# Patient Record
Sex: FEMALE | Race: BLACK OR AFRICAN AMERICAN | NOT HISPANIC OR LATINO | URBAN - METROPOLITAN AREA
[De-identification: names, ages, dates, MRNs, and addresses within clinical notes are randomized per-mention and may not be internally consistent; named-entity substitution may affect disease eponyms.]

---

## 2023-06-25 ENCOUNTER — EMERGENCY (EMERGENCY)
Facility: HOSPITAL | Age: 38
LOS: 1 days | Discharge: PSYCHIATRIC OR SUBSTANCE USE FACILITY | End: 2023-06-27
Attending: STUDENT IN AN ORGANIZED HEALTH CARE EDUCATION/TRAINING PROGRAM
Payer: MEDICAID

## 2023-06-25 VITALS
DIASTOLIC BLOOD PRESSURE: 112 MMHG | RESPIRATION RATE: 18 BRPM | HEART RATE: 116 BPM | WEIGHT: 210.1 LBS | SYSTOLIC BLOOD PRESSURE: 192 MMHG

## 2023-06-25 DIAGNOSIS — E87.6 HYPOKALEMIA: ICD-10-CM

## 2023-06-25 DIAGNOSIS — F12.99 CANNABIS USE, UNSPECIFIED WITH UNSPECIFIED CANNABIS-INDUCED DISORDER: ICD-10-CM

## 2023-06-25 DIAGNOSIS — F31.13 BIPOLAR DISORDER, CURRENT EPISODE MANIC WITHOUT PSYCHOTIC FEATURES, SEVERE: ICD-10-CM

## 2023-06-25 DIAGNOSIS — Z20.822 CONTACT WITH AND (SUSPECTED) EXPOSURE TO COVID-19: ICD-10-CM

## 2023-06-25 DIAGNOSIS — F20.9 SCHIZOPHRENIA, UNSPECIFIED: ICD-10-CM

## 2023-06-25 DIAGNOSIS — D64.9 ANEMIA, UNSPECIFIED: ICD-10-CM

## 2023-06-25 DIAGNOSIS — R45.1 RESTLESSNESS AND AGITATION: ICD-10-CM

## 2023-06-25 LAB
ALBUMIN SERPL ELPH-MCNC: 3.6 G/DL — SIGNIFICANT CHANGE UP (ref 3.3–5)
ALP SERPL-CCNC: 41 U/L — SIGNIFICANT CHANGE UP (ref 40–120)
ALT FLD-CCNC: 88 U/L — HIGH (ref 12–78)
ANION GAP SERPL CALC-SCNC: 9 MMOL/L — SIGNIFICANT CHANGE UP (ref 5–17)
ANISOCYTOSIS BLD QL: SLIGHT — SIGNIFICANT CHANGE UP
APAP SERPL-MCNC: < 2 UG/ML (ref 10–30)
AST SERPL-CCNC: 68 U/L — HIGH (ref 15–37)
BASOPHILS # BLD AUTO: 0.02 K/UL — SIGNIFICANT CHANGE UP (ref 0–0.2)
BASOPHILS NFR BLD AUTO: 0.3 % — SIGNIFICANT CHANGE UP (ref 0–2)
BILIRUB SERPL-MCNC: 0.6 MG/DL — SIGNIFICANT CHANGE UP (ref 0.2–1.2)
BUN SERPL-MCNC: 9 MG/DL — SIGNIFICANT CHANGE UP (ref 7–23)
CALCIUM SERPL-MCNC: 8.7 MG/DL — SIGNIFICANT CHANGE UP (ref 8.5–10.1)
CHLORIDE SERPL-SCNC: 107 MMOL/L — SIGNIFICANT CHANGE UP (ref 96–108)
CO2 SERPL-SCNC: 22 MMOL/L — SIGNIFICANT CHANGE UP (ref 22–31)
CREAT SERPL-MCNC: 0.89 MG/DL — SIGNIFICANT CHANGE UP (ref 0.5–1.3)
EGFR: 85 ML/MIN/1.73M2 — SIGNIFICANT CHANGE UP
EOSINOPHIL # BLD AUTO: 0 K/UL — SIGNIFICANT CHANGE UP (ref 0–0.5)
EOSINOPHIL NFR BLD AUTO: 0 % — SIGNIFICANT CHANGE UP (ref 0–6)
ETHANOL SERPL-MCNC: <10 MG/DL — SIGNIFICANT CHANGE UP (ref 0–10)
FLUAV AG NPH QL: SIGNIFICANT CHANGE UP
FLUBV AG NPH QL: SIGNIFICANT CHANGE UP
GLUCOSE SERPL-MCNC: 115 MG/DL — HIGH (ref 70–99)
HCG SERPL-ACNC: <1 MIU/ML — SIGNIFICANT CHANGE UP
HCT VFR BLD CALC: 25.9 % — LOW (ref 34.5–45)
HGB BLD-MCNC: 7.4 G/DL — LOW (ref 11.5–15.5)
HYPOCHROMIA BLD QL: SLIGHT — SIGNIFICANT CHANGE UP
IMM GRANULOCYTES NFR BLD AUTO: 0.3 % — SIGNIFICANT CHANGE UP (ref 0–0.9)
LYMPHOCYTES # BLD AUTO: 1.2 K/UL — SIGNIFICANT CHANGE UP (ref 1–3.3)
LYMPHOCYTES # BLD AUTO: 19.6 % — SIGNIFICANT CHANGE UP (ref 13–44)
MANUAL SMEAR VERIFICATION: SIGNIFICANT CHANGE UP
MCHC RBC-ENTMCNC: 19.5 PG — LOW (ref 27–34)
MCHC RBC-ENTMCNC: 28.6 G/DL — LOW (ref 32–36)
MCV RBC AUTO: 68.2 FL — LOW (ref 80–100)
MICROCYTES BLD QL: SLIGHT — SIGNIFICANT CHANGE UP
MONOCYTES # BLD AUTO: 0.54 K/UL — SIGNIFICANT CHANGE UP (ref 0–0.9)
MONOCYTES NFR BLD AUTO: 8.8 % — SIGNIFICANT CHANGE UP (ref 2–14)
NEUTROPHILS # BLD AUTO: 4.34 K/UL — SIGNIFICANT CHANGE UP (ref 1.8–7.4)
NEUTROPHILS NFR BLD AUTO: 71 % — SIGNIFICANT CHANGE UP (ref 43–77)
NRBC # BLD: 0 /100 WBCS — SIGNIFICANT CHANGE UP (ref 0–0)
PLAT MORPH BLD: NORMAL — SIGNIFICANT CHANGE UP
PLATELET # BLD AUTO: 201 K/UL — SIGNIFICANT CHANGE UP (ref 150–400)
POLYCHROMASIA BLD QL SMEAR: SLIGHT — SIGNIFICANT CHANGE UP
POTASSIUM SERPL-MCNC: 2.7 MMOL/L — CRITICAL LOW (ref 3.5–5.3)
POTASSIUM SERPL-SCNC: 2.7 MMOL/L — CRITICAL LOW (ref 3.5–5.3)
PROT SERPL-MCNC: 7.6 GM/DL — SIGNIFICANT CHANGE UP (ref 6–8.3)
RBC # BLD: 3.8 M/UL — SIGNIFICANT CHANGE UP (ref 3.8–5.2)
RBC # FLD: 23 % — HIGH (ref 10.3–14.5)
RBC BLD AUTO: ABNORMAL
SALICYLATES SERPL-MCNC: 3.3 MG/DL — SIGNIFICANT CHANGE UP (ref 2.8–20)
SARS-COV-2 RNA SPEC QL NAA+PROBE: SIGNIFICANT CHANGE UP
SODIUM SERPL-SCNC: 138 MMOL/L — SIGNIFICANT CHANGE UP (ref 135–145)
TSH SERPL-MCNC: 1.58 UIU/ML — SIGNIFICANT CHANGE UP (ref 0.36–3.74)
WBC # BLD: 6.12 K/UL — SIGNIFICANT CHANGE UP (ref 3.8–10.5)
WBC # FLD AUTO: 6.12 K/UL — SIGNIFICANT CHANGE UP (ref 3.8–10.5)

## 2023-06-25 PROCEDURE — 99285 EMERGENCY DEPT VISIT HI MDM: CPT

## 2023-06-25 PROCEDURE — 70450 CT HEAD/BRAIN W/O DYE: CPT | Mod: 26,MA

## 2023-06-25 RX ORDER — OLANZAPINE 15 MG/1
10 TABLET, FILM COATED ORAL ONCE
Refills: 0 | Status: DISCONTINUED | OUTPATIENT
Start: 2023-06-25 | End: 2023-06-25

## 2023-06-25 RX ORDER — POTASSIUM CHLORIDE 20 MEQ
10 PACKET (EA) ORAL
Refills: 0 | Status: COMPLETED | OUTPATIENT
Start: 2023-06-25 | End: 2023-06-25

## 2023-06-25 RX ORDER — DIPHENHYDRAMINE HCL 50 MG
50 CAPSULE ORAL ONCE
Refills: 0 | Status: COMPLETED | OUTPATIENT
Start: 2023-06-25 | End: 2023-06-25

## 2023-06-25 RX ORDER — HALOPERIDOL DECANOATE 100 MG/ML
5 INJECTION INTRAMUSCULAR ONCE
Refills: 0 | Status: COMPLETED | OUTPATIENT
Start: 2023-06-25 | End: 2023-06-25

## 2023-06-25 RX ORDER — POTASSIUM CHLORIDE 20 MEQ
40 PACKET (EA) ORAL EVERY 4 HOURS
Refills: 0 | Status: DISCONTINUED | OUTPATIENT
Start: 2023-06-25 | End: 2023-06-26

## 2023-06-25 RX ADMIN — HALOPERIDOL DECANOATE 5 MILLIGRAM(S): 100 INJECTION INTRAMUSCULAR at 18:15

## 2023-06-25 RX ADMIN — Medication 100 MILLIEQUIVALENT(S): at 21:56

## 2023-06-25 RX ADMIN — Medication 100 MILLIEQUIVALENT(S): at 20:44

## 2023-06-25 RX ADMIN — Medication 100 MILLIEQUIVALENT(S): at 23:25

## 2023-06-25 RX ADMIN — Medication 2 MILLIGRAM(S): at 18:16

## 2023-06-25 RX ADMIN — HALOPERIDOL DECANOATE 5 MILLIGRAM(S): 100 INJECTION INTRAMUSCULAR at 17:11

## 2023-06-25 RX ADMIN — Medication 50 MILLIGRAM(S): at 17:11

## 2023-06-25 RX ADMIN — Medication 2 MILLIGRAM(S): at 17:12

## 2023-06-25 RX ADMIN — Medication 40 MILLIEQUIVALENT(S): at 20:43

## 2023-06-25 NOTE — ED PROVIDER NOTE - ATTENDING CONTRIBUTION TO CARE
Pt seen primarily by NP. Case, treatment plan, disposition discussed with NP. Pt for psych admission and transfer. I have read the documenation by KESHA Alanis and agree with it as written.

## 2023-06-25 NOTE — ED ADULT NURSE NOTE - ED STAT RN HANDOFF DETAILS
Report given to Tamela REARDON .Constant observation in progress. Mother at bedside, no complaints voiced at this time. Safety measures in place. Assessment ongoing.

## 2023-06-25 NOTE — ED ADULT NURSE NOTE - NSFALLUNIVINTERV_ED_ALL_ED
Bed/Stretcher in lowest position, wheels locked, appropriate side rails in place/Call bell, personal items and telephone in reach/Instruct patient to call for assistance before getting out of bed/chair/stretcher/Non-slip footwear applied when patient is off stretcher/Mallie to call system/Physically safe environment - no spills, clutter or unnecessary equipment/Purposeful proactive rounding/Room/bathroom lighting operational, light cord in reach

## 2023-06-25 NOTE — ED ADULT NURSE REASSESSMENT NOTE - NS ED NURSE REASSESS COMMENT FT1
Patient agitated and screaming, attempting to leave room. Code gray called, meds administered as ordered, which patient willingly accepted. Brother remains in room, EO sitter at bedside.

## 2023-06-25 NOTE — ED ADULT TRIAGE NOTE - CHIEF COMPLAINT QUOTE
pt c/o insomnia, hallucinating, and aggressive behaviour at home for  3 days.  history of paranoid schizophrenia. pt c/o insomnia, hallucinating, and aggressive behaviour at home for  3 days.  history of paranoid schizophrenia non complaint with medication.

## 2023-06-25 NOTE — ED ADULT NURSE NOTE - CHIEF COMPLAINT QUOTE
pt c/o insomnia, hallucinating, and aggressive behaviour at home for  3 days.  history of paranoid schizophrenia non complaint with medication.

## 2023-06-25 NOTE — ED ADULT NURSE REASSESSMENT NOTE - NS ED NURSE REASSESS COMMENT FT1
Pt received in bed with family at bedside. Constant observation in progress. No acute distress noted. Safety measures in place. Assessment ongoing.

## 2023-06-25 NOTE — ED PROVIDER NOTE - PROGRESS NOTE DETAILS
Patient pending telepsych damaris. K low and repleted in the ED. Patient became agitated and requiring 5haldol/2ativan/50benadryl for sedation purposes. Alex Salcido DO Pt was seen and treated by Dr. Salcido pending telepsych disposition. Pt is alert and oriented x 3 smiling pleasant sts she is feeling ,much better and denies harmful thoughts to herself or others. According to the medical tech who is doing close observation pt still talking in the empty room and grasping thing in the air. Pt is speaking in clear full sentences cooperative.

## 2023-06-25 NOTE — ED PROVIDER NOTE - OBJECTIVE STATEMENT
37-year-old female remote history of schizophrenia last hospitalized approximately 15 years ago to the hospital last time she is on medications per mother presents for erratic behavior.  Mother states that over the last week patient has been having worsening erratic behavior as well as making statements that she is going to hell she is actively dying and telling everybody around her goodbye.  Patient also describes visual hallucinations of different colors such as red-blue and has been talking about herself in the third person.  Mother states that 15 years ago she was hospitalized for similar behavior.  Mother also states that patient has been drinking vodka and using marijuana however believes that the last time was approximately 1 week ago.  Patient screaming and erratic at time of interview unable to obtain further HPI from patient.

## 2023-06-25 NOTE — ED PROVIDER NOTE - CLINICAL SUMMARY MEDICAL DECISION MAKING FREE TEXT BOX
37-year-old female remote history of schizophrenia last hospitalized approximately 15 years ago to the hospital last time she is on medications per mother presents for erratic behavior.  Given most recent decompensation 15 years ago, will do CT head, will check labs, psych consult.  due to erratic behavior and danger to self and others, patient medicated with IM medication for extreme agitation.
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
## 2023-06-26 DIAGNOSIS — F31.13 BIPOLAR DISORDER, CURRENT EPISODE MANIC WITHOUT PSYCHOTIC FEATURES, SEVERE: ICD-10-CM

## 2023-06-26 LAB
AMPHET UR-MCNC: NEGATIVE — SIGNIFICANT CHANGE UP
ANION GAP SERPL CALC-SCNC: 6 MMOL/L — SIGNIFICANT CHANGE UP (ref 5–17)
APPEARANCE UR: CLEAR — SIGNIFICANT CHANGE UP
BACTERIA # UR AUTO: ABNORMAL
BARBITURATES UR SCN-MCNC: NEGATIVE — SIGNIFICANT CHANGE UP
BENZODIAZ UR-MCNC: POSITIVE — SIGNIFICANT CHANGE UP
BILIRUB UR-MCNC: NEGATIVE — SIGNIFICANT CHANGE UP
BUN SERPL-MCNC: 8 MG/DL — SIGNIFICANT CHANGE UP (ref 7–23)
CALCIUM SERPL-MCNC: 9.1 MG/DL — SIGNIFICANT CHANGE UP (ref 8.5–10.1)
CHLORIDE SERPL-SCNC: 113 MMOL/L — HIGH (ref 96–108)
CO2 SERPL-SCNC: 21 MMOL/L — LOW (ref 22–31)
COCAINE METAB.OTHER UR-MCNC: NEGATIVE — SIGNIFICANT CHANGE UP
COLOR SPEC: YELLOW — SIGNIFICANT CHANGE UP
COVID-19 SPIKE DOMAIN AB INTERP: POSITIVE
COVID-19 SPIKE DOMAIN ANTIBODY RESULT: >250 U/ML — HIGH
CREAT SERPL-MCNC: 0.87 MG/DL — SIGNIFICANT CHANGE UP (ref 0.5–1.3)
DIFF PNL FLD: NEGATIVE — SIGNIFICANT CHANGE UP
EGFR: 87 ML/MIN/1.73M2 — SIGNIFICANT CHANGE UP
EPI CELLS # UR: ABNORMAL
GLUCOSE SERPL-MCNC: 110 MG/DL — HIGH (ref 70–99)
GLUCOSE UR QL: NEGATIVE MG/DL — SIGNIFICANT CHANGE UP
KETONES UR-MCNC: ABNORMAL
LEUKOCYTE ESTERASE UR-ACNC: NEGATIVE — SIGNIFICANT CHANGE UP
METHADONE UR-MCNC: NEGATIVE — SIGNIFICANT CHANGE UP
NITRITE UR-MCNC: NEGATIVE — SIGNIFICANT CHANGE UP
OPIATES UR-MCNC: NEGATIVE — SIGNIFICANT CHANGE UP
PCP SPEC-MCNC: SIGNIFICANT CHANGE UP
PCP UR-MCNC: NEGATIVE — SIGNIFICANT CHANGE UP
PH UR: 6.5 — SIGNIFICANT CHANGE UP (ref 5–8)
POTASSIUM SERPL-MCNC: 3.4 MMOL/L — LOW (ref 3.5–5.3)
POTASSIUM SERPL-SCNC: 3.4 MMOL/L — LOW (ref 3.5–5.3)
PROT UR-MCNC: 30 MG/DL
RBC CASTS # UR COMP ASSIST: SIGNIFICANT CHANGE UP /HPF (ref 0–4)
SARS-COV-2 IGG+IGM SERPL QL IA: >250 U/ML — HIGH
SARS-COV-2 IGG+IGM SERPL QL IA: POSITIVE
SODIUM SERPL-SCNC: 140 MMOL/L — SIGNIFICANT CHANGE UP (ref 135–145)
SP GR SPEC: 1.01 — SIGNIFICANT CHANGE UP (ref 1.01–1.02)
THC UR QL: POSITIVE — SIGNIFICANT CHANGE UP
UROBILINOGEN FLD QL: NEGATIVE MG/DL — SIGNIFICANT CHANGE UP
WBC UR QL: SIGNIFICANT CHANGE UP

## 2023-06-26 PROCEDURE — 93010 ELECTROCARDIOGRAM REPORT: CPT

## 2023-06-26 PROCEDURE — 90792 PSYCH DIAG EVAL W/MED SRVCS: CPT | Mod: 95

## 2023-06-26 RX ORDER — OLANZAPINE 15 MG/1
10 TABLET, FILM COATED ORAL ONCE
Refills: 0 | Status: COMPLETED | OUTPATIENT
Start: 2023-06-26 | End: 2023-06-26

## 2023-06-26 RX ORDER — HALOPERIDOL DECANOATE 100 MG/ML
5 INJECTION INTRAMUSCULAR ONCE
Refills: 0 | Status: COMPLETED | OUTPATIENT
Start: 2023-06-26 | End: 2023-06-26

## 2023-06-26 RX ORDER — DIPHENHYDRAMINE HCL 50 MG
50 CAPSULE ORAL ONCE
Refills: 0 | Status: COMPLETED | OUTPATIENT
Start: 2023-06-26 | End: 2023-06-26

## 2023-06-26 RX ORDER — MIDAZOLAM HYDROCHLORIDE 1 MG/ML
5 INJECTION, SOLUTION INTRAMUSCULAR; INTRAVENOUS ONCE
Refills: 0 | Status: DISCONTINUED | OUTPATIENT
Start: 2023-06-26 | End: 2023-06-26

## 2023-06-26 RX ORDER — POTASSIUM CHLORIDE 20 MEQ
40 PACKET (EA) ORAL ONCE
Refills: 0 | Status: COMPLETED | OUTPATIENT
Start: 2023-06-26 | End: 2023-06-26

## 2023-06-26 RX ADMIN — HALOPERIDOL DECANOATE 5 MILLIGRAM(S): 100 INJECTION INTRAMUSCULAR at 02:28

## 2023-06-26 RX ADMIN — Medication 40 MILLIEQUIVALENT(S): at 04:17

## 2023-06-26 RX ADMIN — OLANZAPINE 10 MILLIGRAM(S): 15 TABLET, FILM COATED ORAL at 04:42

## 2023-06-26 RX ADMIN — Medication 50 MILLIGRAM(S): at 02:28

## 2023-06-26 RX ADMIN — Medication 2 MILLIGRAM(S): at 02:29

## 2023-06-26 RX ADMIN — Medication 40 MILLIEQUIVALENT(S): at 08:20
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
## 2023-06-26 NOTE — ED BEHAVIORAL HEALTH NOTE - BEHAVIORAL HEALTH NOTE
==================  PRE-HOSPITAL COURSE  ===================  SOURCE:  NP Rieman and secondhand ED documentation  DETAILS: BIB family member for erratic behavior and VH  =========  ED COURSE  =========  SOURCE:  NP and secondhand ED documentation.  ARRIVAL:  Per chart and NP, patient arrived via walk in accompanied by family member. Per NP, patient was calm upon arrival, and cooperative with triage process.  BELONGINGS:  Per chart, patient arrived with belongings. All belongings were provided to hospital security, and patient currently in a gown with a 1:1 staff member.  BEHAVIOR: NP described patient to be erratic, bizarre, intermittently screaming but able to be verbally redirected by family members, disorganized, requiring medication, after medication has been calm and cooperative, presenting with disorganized thought process, AAOx3, presenting with anxious mood and congruent affect, remains in behavioral and impulse control, is not violent/aggressive. NP stated that the patient is endorsing VH. NP stated that there are no visible marks, bruises, or lacerations on the body. NP stated that the patient appears to be adequately groomed, maintains fair hygiene.  TREATMENT:  Per chart and NP, patient did not receive PRN medications.   VISITORS:  Per NP, brother at bedside.          COVID Exposure Screen- collateral (i.e. third-party, chart review, belongings, etc; include EMS and ED staff)   ---------------------------------------------------  1. Has the patient had a COVID-19 test in the last 90 days? Unknown.   2. Has the patient tested positive for COVID-19 antibodies? Unknown.   3. Has the patient received 2 doses of the COVID-19 vaccine? Unknown  4. In the past 10 days, has the patient been around anyone with a positive COVID-19 test?* Unknown.   5. Has the patient been out of [place] within the past 10 days? Unknown ==================  PRE-HOSPITAL COURSE  ===================  SOURCE:  NP Rieman and secondhand ED documentation  DETAILS: BIB family member for erratic behavior and VH  =========  ED COURSE  =========  SOURCE:  NP and secondhand ED documentation.  ARRIVAL:  Per chart and NP, patient arrived via walk in accompanied by family member. Per NP, patient was calm upon arrival, and cooperative with triage process.  BELONGINGS:  Per chart, patient arrived with belongings. All belongings were provided to hospital security, and patient currently in a gown with a 1:1 staff member.  BEHAVIOR: NP described patient to be erratic, bizarre, intermittently screaming but able to be verbally redirected by family members, disorganized, requiring medication, after medication has been calm and cooperative, presenting with disorganized thought process, AAOx3, presenting with anxious mood and congruent affect, remains in behavioral and impulse control, is not violent/aggressive. NP stated that the patient is endorsing VH. NP stated that there are no visible marks, bruises, or lacerations on the body. NP stated that the patient appears to be adequately groomed, maintains fair hygiene.  TREATMENT:  Per chart and NP, patient received PRN medications: IM Haldol 5mg, Ativan 2mg, Benadryl 50mg.   VISITORS:  Per NP, brother at bedside.          COVID Exposure Screen- collateral (i.e. third-party, chart review, belongings, etc; include EMS and ED staff)   ---------------------------------------------------  1. Has the patient had a COVID-19 test in the last 90 days? Unknown.   2. Has the patient tested positive for COVID-19 antibodies? Unknown.   3. Has the patient received 2 doses of the COVID-19 vaccine? Unknown  4. In the past 10 days, has the patient been around anyone with a positive COVID-19 test?* Unknown.   5. Has the patient been out of [place] within the past 10 days? Unknown ==================  PRE-HOSPITAL COURSE  ===================  SOURCE:  NP Rieman and secondhand ED documentation  DETAILS: BIB family member for erratic behavior and VH  =========  ED COURSE  =========  SOURCE:  NP and secondhand ED documentation.  ARRIVAL:  Per chart and NP, patient arrived via walk in accompanied by family member. Per NP, patient was calm upon arrival, and cooperative with triage process.  BELONGINGS:  Per chart, patient arrived with belongings. All belongings were provided to hospital security, and patient currently in a gown with a 1:1 staff member.  BEHAVIOR: NP described patient to be erratic, bizarre, intermittently screaming but able to be verbally redirected by family members, disorganized, requiring medication, after medication has been calm and cooperative, presenting with disorganized thought process, AAOx3, presenting with anxious mood and congruent affect, remains in behavioral and impulse control, is not violent/aggressive. NP stated that the patient is endorsing VH. NP stated that there are no visible marks, bruises, or lacerations on the body. NP stated that the patient appears to be adequately groomed, maintains fair hygiene.  TREATMENT:  Per chart and NP, patient received PRN medications: IM Haldol 5mg, Ativan 2mg, Benadryl 50mg; Haldol 5mg, Ativan 2mg; Haldol 5mg, Ativan 2mg, Benadryl 50mg.   VISITORS:  Per NP, brother at bedside.          COVID Exposure Screen- collateral (i.e. third-party, chart review, belongings, etc; include EMS and ED staff)   ---------------------------------------------------  1. Has the patient had a COVID-19 test in the last 90 days? Unknown.   2. Has the patient tested positive for COVID-19 antibodies? Unknown.   3. Has the patient received 2 doses of the COVID-19 vaccine? Unknown  4. In the past 10 days, has the patient been around anyone with a positive COVID-19 test?* Unknown.   5. Has the patient been out of [place] within the past 10 days? Unknown

## 2023-06-26 NOTE — ED ADULT NURSE REASSESSMENT NOTE - NS ED NURSE REASSESS COMMENT FT1
covering for primary nurse pt at 0049 pt in bed comfortably with son at bedside , no distress noted will continue to monitor.
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
## 2023-06-26 NOTE — ED ADULT NURSE REASSESSMENT NOTE - NS ED NURSE REASSESS COMMENT FT1
Olanzapine administered IM. Pt resting in bed. No acute distress noted. Safety measures in place. Assessment ongoing.

## 2023-06-26 NOTE — ED BEHAVIORAL HEALTH ASSESSMENT NOTE - DESCRIPTION
see note by Parul Skelton "thyroid issues" per brother, TSH normal, presence of anemia Lives with family, not working

## 2023-06-26 NOTE — ED BEHAVIORAL HEALTH ASSESSMENT NOTE - NSBHSUBSTUSESTATEMENT_PSY_A_CORE
Smoking even a single puff increases the likelihood of a full relapse, withdrawal symptoms peak within 1-2 weeks, but can persist for months .
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
## 2023-06-26 NOTE — ED ADULT NURSE REASSESSMENT NOTE - NS ED NURSE REASSESS COMMENT FT1
patient received laying comfortable in stretcher, alert and oriented x4. pt currently calm and cooperative, denies pain or discomfort at this moment. mother at bedside. pt is 1:1, tech at bedside for observations, room checked.

## 2023-06-26 NOTE — ED BEHAVIORAL HEALTH ASSESSMENT NOTE - RISK ASSESSMENT
Protective factors are support of family, no access to lethal means, no suicidal thought content    risk factors of no connection to outpatient treatment, medication non-adherence, psychosis and nelda, substance use disorder, prior suicide attempt

## 2023-06-27 ENCOUNTER — INPATIENT (INPATIENT)
Facility: HOSPITAL | Age: 38
LOS: 15 days | Discharge: HOME | DRG: 885 | End: 2023-07-13
Attending: PSYCHIATRY & NEUROLOGY | Admitting: PSYCHIATRY & NEUROLOGY
Payer: MEDICAID

## 2023-06-27 VITALS
DIASTOLIC BLOOD PRESSURE: 87 MMHG | OXYGEN SATURATION: 98 % | HEART RATE: 92 BPM | SYSTOLIC BLOOD PRESSURE: 141 MMHG | TEMPERATURE: 99 F | RESPIRATION RATE: 18 BRPM

## 2023-06-27 VITALS
HEART RATE: 91 BPM | OXYGEN SATURATION: 98 % | SYSTOLIC BLOOD PRESSURE: 130 MMHG | DIASTOLIC BLOOD PRESSURE: 83 MMHG | RESPIRATION RATE: 17 BRPM | TEMPERATURE: 99 F

## 2023-06-27 RX ORDER — QUETIAPINE FUMARATE 200 MG/1
50 TABLET, FILM COATED ORAL AT BEDTIME
Refills: 0 | Status: DISCONTINUED | OUTPATIENT
Start: 2023-06-27 | End: 2023-07-13

## 2023-06-27 RX ORDER — HALOPERIDOL DECANOATE 100 MG/ML
5 INJECTION INTRAMUSCULAR EVERY 6 HOURS
Refills: 0 | Status: DISCONTINUED | OUTPATIENT
Start: 2023-06-27 | End: 2023-07-13

## 2023-06-27 RX ORDER — DIPHENHYDRAMINE HCL 50 MG
50 CAPSULE ORAL AT BEDTIME
Refills: 0 | Status: DISCONTINUED | OUTPATIENT
Start: 2023-06-27 | End: 2023-07-13

## 2023-06-27 RX ORDER — MAGNESIUM HYDROXIDE 400 MG/1
30 TABLET, CHEWABLE ORAL DAILY
Refills: 0 | Status: DISCONTINUED | OUTPATIENT
Start: 2023-06-27 | End: 2023-07-13

## 2023-06-27 RX ORDER — ACETAMINOPHEN 500 MG
650 TABLET ORAL EVERY 6 HOURS
Refills: 0 | Status: DISCONTINUED | OUTPATIENT
Start: 2023-06-27 | End: 2023-07-13

## 2023-06-27 RX ADMIN — QUETIAPINE FUMARATE 50 MILLIGRAM(S): 200 TABLET, FILM COATED ORAL at 21:45

## 2023-06-27 RX ADMIN — Medication 50 MILLIGRAM(S): at 21:45

## 2023-06-27 NOTE — ED ADULT NURSE REASSESSMENT NOTE - NS ED NURSE REASSESS COMMENT FT1
Rec'd pt on 1:1 with PCA Yarelis Headley. Pt is calm and awaiting Psychiatric bed assignment. Pt in no distress and resting comfortably. Will continue to monitor pt.

## 2023-06-27 NOTE — BH PATIENT PROFILE - WILL THE PATIENT ACCEPT THE PFIZER COVID-19 VACCINE IF ELIGIBLE AND IT IS AVAILABLE?
Injectable influenza vaccine documentation    1. Has the patient received the information for the influenza vaccine? YES    2. Does the patient have a severe allergy to eggs (Patients with a severe egg allergy should be assessed by a medical provider, RN, or clinical pharmacist. If they receive the influenza vaccine, please have them observed for 15 minutes.)? No    3. Has the patient had an allergic reaction to previous influenza vaccines? No    4. Has the patient had any severe allergic reactions to past influenza vaccines ? No       5. Does patient have a history of Guillain-Milan syndrome? No      Based on responses above, I administered the influenza vaccine.  Reid Alexander, CMA   No

## 2023-06-27 NOTE — ED BEHAVIORAL HEALTH NOTE - BEHAVIORAL HEALTH NOTE
Per RN patient remains gowned, wanded, and in private room on 1:1 observation. Patient remains sleeping throughout night in hospital bed, no SI/HI/AH/VH elicited, no social supports at bedside. Per RN patient has not required medication intervention overnight and remains in good behavioral control. Patient pending psychiatric bed placement at this time.

## 2023-06-27 NOTE — ED ADULT NURSE REASSESSMENT NOTE - NS ED NURSE REASSESS COMMENT FT1
Patient awake and alert, notified of transfer to [facility] and is calm and cooperative. Patient allowed to use phone to call brotherChris. Patient left ED on stretcher with EMS.

## 2023-06-28 DIAGNOSIS — F19.90 OTHER PSYCHOACTIVE SUBSTANCE USE, UNSPECIFIED, UNCOMPLICATED: ICD-10-CM

## 2023-06-28 DIAGNOSIS — F19.94 OTHER PSYCHOACTIVE SUBSTANCE USE, UNSPECIFIED WITH PSYCHOACTIVE SUBSTANCE-INDUCED MOOD DISORDER: ICD-10-CM

## 2023-06-28 LAB
A1C WITH ESTIMATED AVERAGE GLUCOSE RESULT: 4.8 % — SIGNIFICANT CHANGE UP (ref 4–5.6)
ALBUMIN SERPL ELPH-MCNC: 4.4 G/DL — SIGNIFICANT CHANGE UP (ref 3.3–5)
ALP SERPL-CCNC: 46 U/L — SIGNIFICANT CHANGE UP (ref 40–120)
ALT FLD-CCNC: 69 U/L — HIGH (ref 10–45)
ANION GAP SERPL CALC-SCNC: 11 MMOL/L — SIGNIFICANT CHANGE UP (ref 5–17)
AST SERPL-CCNC: 41 U/L — HIGH (ref 10–40)
BILIRUB SERPL-MCNC: 0.5 MG/DL — SIGNIFICANT CHANGE UP (ref 0.2–1.2)
BUN SERPL-MCNC: 8 MG/DL — SIGNIFICANT CHANGE UP (ref 7–23)
CALCIUM SERPL-MCNC: 9.4 MG/DL — SIGNIFICANT CHANGE UP (ref 8.4–10.5)
CHLORIDE SERPL-SCNC: 102 MMOL/L — SIGNIFICANT CHANGE UP (ref 96–108)
CHOLEST SERPL-MCNC: 137 MG/DL — SIGNIFICANT CHANGE UP
CHOLEST SERPL-MCNC: 138 MG/DL — SIGNIFICANT CHANGE UP
CO2 SERPL-SCNC: 21 MMOL/L — LOW (ref 22–31)
CREAT SERPL-MCNC: 0.79 MG/DL — SIGNIFICANT CHANGE UP (ref 0.5–1.3)
EGFR: 97 ML/MIN/1.73M2 — SIGNIFICANT CHANGE UP
ESTIMATED AVERAGE GLUCOSE: 91 MG/DL — SIGNIFICANT CHANGE UP (ref 68–114)
GLUCOSE SERPL-MCNC: 99 MG/DL — SIGNIFICANT CHANGE UP (ref 70–99)
HCG SERPL-ACNC: <0 MIU/ML — SIGNIFICANT CHANGE UP
HCT VFR BLD CALC: 30.3 % — LOW (ref 34.5–45)
HDLC SERPL-MCNC: 39 MG/DL — LOW
HDLC SERPL-MCNC: 39 MG/DL — LOW
HGB BLD-MCNC: 8.5 G/DL — LOW (ref 11.5–15.5)
LIPID PNL WITH DIRECT LDL SERPL: 85 MG/DL — SIGNIFICANT CHANGE UP
LIPID PNL WITH DIRECT LDL SERPL: 87 MG/DL — SIGNIFICANT CHANGE UP
MCHC RBC-ENTMCNC: 19.8 PG — LOW (ref 27–34)
MCHC RBC-ENTMCNC: 28.1 GM/DL — LOW (ref 32–36)
MCV RBC AUTO: 70.5 FL — LOW (ref 80–100)
NON HDL CHOLESTEROL: 98 MG/DL — SIGNIFICANT CHANGE UP
NON HDL CHOLESTEROL: 99 MG/DL — SIGNIFICANT CHANGE UP
NRBC # BLD: 0 /100 WBCS — SIGNIFICANT CHANGE UP (ref 0–0)
PLATELET # BLD AUTO: 195 K/UL — SIGNIFICANT CHANGE UP (ref 150–400)
POTASSIUM SERPL-MCNC: 4.1 MMOL/L — SIGNIFICANT CHANGE UP (ref 3.5–5.3)
POTASSIUM SERPL-SCNC: 4.1 MMOL/L — SIGNIFICANT CHANGE UP (ref 3.5–5.3)
PROT SERPL-MCNC: 8.2 G/DL — SIGNIFICANT CHANGE UP (ref 6–8.3)
RBC # BLD: 4.3 M/UL — SIGNIFICANT CHANGE UP (ref 3.8–5.2)
RBC # FLD: 23.5 % — HIGH (ref 10.3–14.5)
SODIUM SERPL-SCNC: 134 MMOL/L — LOW (ref 135–145)
TRIGL SERPL-MCNC: 61 MG/DL — SIGNIFICANT CHANGE UP
TRIGL SERPL-MCNC: 64 MG/DL — SIGNIFICANT CHANGE UP
TSH SERPL-MCNC: 0.71 UIU/ML — SIGNIFICANT CHANGE UP (ref 0.27–4.2)
WBC # BLD: 6.19 K/UL — SIGNIFICANT CHANGE UP (ref 3.8–10.5)
WBC # FLD AUTO: 6.19 K/UL — SIGNIFICANT CHANGE UP (ref 3.8–10.5)

## 2023-06-28 PROCEDURE — 99231 SBSQ HOSP IP/OBS SF/LOW 25: CPT

## 2023-06-28 RX ORDER — NICOTINE POLACRILEX 2 MG
1 GUM BUCCAL DAILY
Refills: 0 | Status: DISCONTINUED | OUTPATIENT
Start: 2023-06-28 | End: 2023-07-12

## 2023-06-28 RX ORDER — ARIPIPRAZOLE 15 MG/1
5 TABLET ORAL DAILY
Refills: 0 | Status: DISCONTINUED | OUTPATIENT
Start: 2023-06-28 | End: 2023-06-30

## 2023-06-28 RX ORDER — NICOTINE POLACRILEX 2 MG
2 GUM BUCCAL
Refills: 0 | Status: DISCONTINUED | OUTPATIENT
Start: 2023-06-28 | End: 2023-07-13

## 2023-06-28 RX ADMIN — Medication 50 MILLIGRAM(S): at 21:32

## 2023-06-28 RX ADMIN — ARIPIPRAZOLE 5 MILLIGRAM(S): 15 TABLET ORAL at 14:50

## 2023-06-28 RX ADMIN — QUETIAPINE FUMARATE 50 MILLIGRAM(S): 200 TABLET, FILM COATED ORAL at 21:31

## 2023-06-28 NOTE — BH INPATIENT PSYCHIATRY ASSESSMENT NOTE - NSBHCHARTREVIEWINVESTIGATE_PSY_A_CORE FT
ACC: [id number] EXAM:  CT BRAIN   ORDERED BY: DAKOTAH BLEVINS     PROCEDURE DATE:  06/25/2023          INTERPRETATION:  EXAM: CT head without contrast    INDICATION: Altered mental status    TECHNIQUE: CT examination of the head performed without contrast.   Multiple axial images obtained from skull base to vertex.   Sagittal/coronal reformatted images obtained from axial data set. Images   reviewed in soft tissue and bone windows.      COMPARISON: None available.    FINDINGS:    Ventricles and sulci are normal in size and configuration for patient's   age. No hydrocephalus. No extra-axial fluid collection identified.    No acute intracranial hemorrhage identified. Supratentorial white matter   is unremarkable in attenuation. Gray-white differentiation is preserved   without CT evidence for acute transcortical infarction. There is no   significant midline shift, mass effect or herniation.    Sellar and suprasellar region are unremarkable in appearance.    Visualized paranasal sinuses and mastoid air cells are well aerated. No   significant cerebellar tonsillar herniation.    No displaced calvarial fractures are identified. No suspicious expansile   or destructive calvarial lesion identified. No significant scalp soft   tissue swelling identified.

## 2023-06-28 NOTE — DIETITIAN INITIAL EVALUATION ADULT - PERTINENT LABORATORY DATA
06-28    134<L>  |  102  |  8   ----------------------------<  99  4.1   |  21<L>  |  0.79    Ca    9.4      28 Jun 2023 10:58    TPro  8.2  /  Alb  4.4  /  TBili  0.5  /  DBili  x   /  AST  41<H>  /  ALT  69<H>  /  AlkPhos  46  06-28  A1C with Estimated Average Glucose Result: 4.8 % (06-28-23 @ 10:58)

## 2023-06-28 NOTE — BH INPATIENT PSYCHIATRY ASSESSMENT NOTE - CURRENT MEDICATION
MEDICATIONS  (STANDING):  QUEtiapine 50 milliGRAM(s) Oral at bedtime    MEDICATIONS  (PRN):  acetaminophen     Tablet .. 650 milliGRAM(s) Oral every 6 hours PRN Temp greater or equal to 38C (100.4F), Mild Pain (1 - 3), Moderate Pain (4 - 6), Severe Pain (7 - 10)  aluminum hydroxide/magnesium hydroxide/simethicone Suspension 30 milliLiter(s) Oral every 6 hours PRN Dyspepsia  diphenhydrAMINE 50 milliGRAM(s) Oral at bedtime PRN Insomnia  haloperidol     Tablet 5 milliGRAM(s) Oral every 6 hours PRN agitation  LORazepam     Tablet 2 milliGRAM(s) Oral every 6 hours PRN anxiety  magnesium hydroxide Suspension 30 milliLiter(s) Oral daily PRN Constipation   MEDICATIONS  (STANDING):  ARIPiprazole 5 milliGRAM(s) Oral daily  QUEtiapine 50 milliGRAM(s) Oral at bedtime    MEDICATIONS  (PRN):  acetaminophen     Tablet .. 650 milliGRAM(s) Oral every 6 hours PRN Temp greater or equal to 38C (100.4F), Mild Pain (1 - 3), Moderate Pain (4 - 6), Severe Pain (7 - 10)  aluminum hydroxide/magnesium hydroxide/simethicone Suspension 30 milliLiter(s) Oral every 6 hours PRN Dyspepsia  diphenhydrAMINE 50 milliGRAM(s) Oral at bedtime PRN Insomnia  haloperidol     Tablet 5 milliGRAM(s) Oral every 6 hours PRN agitation  LORazepam     Tablet 2 milliGRAM(s) Oral every 6 hours PRN anxiety  magnesium hydroxide Suspension 30 milliLiter(s) Oral daily PRN Constipation

## 2023-06-28 NOTE — BH INPATIENT PSYCHIATRY ASSESSMENT NOTE - RISK ASSESSMENT
Protective factors are support of family, no access to lethal means, no suicidal thought content    risk factors of no connection to outpatient treatment, medication non-adherence, psychosis and nelda, substance use disorder, prior suicide attempt Static: mental illness, alcohol/substance abuse dx, prior SA, family hx of mental illness, family hx of substance abuse, prior hospitalization  Risk: current mood and psychotic sxs, no current outpatient treatment/medications  Protective factors are support of family, no access to lethal means, no suicidal thought content, future oriented, physically fit
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
## 2023-06-28 NOTE — BH INPATIENT PSYCHIATRY ASSESSMENT NOTE - NSTXMANICGOAL_PSY_ALL_CORE
Be able to identify the early signs of nelda (e.g. sleep and mood changes) and to employ coping strategies to minimize acting out
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
## 2023-06-28 NOTE — BH INPATIENT PSYCHIATRY ASSESSMENT NOTE - NSBHMETABOLIC_PSY_ALL_CORE_FT
BMI: BMI (kg/m2): 33.1 (06-27-23 @ 18:04)  HbA1c:   Glucose:   BP: 141/87 (06-27-23 @ 16:50) (141/87 - 141/87)  Lipid Panel:  BMI: BMI (kg/m2): 33.1 (06-27-23 @ 18:04)  HbA1c:   Glucose:   BP: 147/96 (06-28-23 @ 08:53) (141/87 - 147/96)  Lipid Panel: Date/Time: 06-28-23 @ 10:58  Cholesterol, Serum: 137  Direct LDL: --  HDL Cholesterol, Serum: 39  Total Cholesterol/HDL Ration Measurement: --  Triglycerides, Serum: 64   BMI: BMI (kg/m2): 33.1 (06-27-23 @ 18:04)  HbA1c: A1C with Estimated Average Glucose Result: 4.8 % (06-28-23 @ 10:58)    Glucose:   BP: 147/96 (06-28-23 @ 08:53) (141/87 - 147/96)  Lipid Panel: Date/Time: 06-28-23 @ 10:58  Cholesterol, Serum: 137  Direct LDL: --  HDL Cholesterol, Serum: 39  Total Cholesterol/HDL Ration Measurement: --  Triglycerides, Serum: 64
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
## 2023-06-28 NOTE — BH INPATIENT PSYCHIATRY ASSESSMENT NOTE - NSBHATTESTAPPBILLTIME_PSY_A_CORE
I attest my time as JANELLE is greater than 50% of the total combined time spent on qualifying patient care activities. I have reviewed and verified the documentation.
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
## 2023-06-28 NOTE — DIETITIAN INITIAL EVALUATION ADULT - OTHER CALCULATIONS
Based on Standards of Care pt above % IBW (157%) thus ideal body weight used for all calculations (130#)

## 2023-06-28 NOTE — DIETITIAN INITIAL EVALUATION ADULT - PERSON TAUGHT/METHOD
Reviewed general healthful diet, potential risks of intermittent fasting, gradual weight loss/verbal instruction/patient instructed

## 2023-06-28 NOTE — BH SOCIAL WORK INITIAL PSYCHOSOCIAL EVALUATION - NSCMSPTSTRENGTHS_PSY_ALL_CORE
Courageous/Expressive of emotions/Future/goal oriented/Highly motivated for treatment/Humor/Self confidence/Supportive family
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
## 2023-06-28 NOTE — BH INPATIENT PSYCHIATRY ASSESSMENT NOTE - NSBHCHARTREVIEWVS_PSY_A_CORE FT
Vital Signs Last 24 Hrs  T(C): 37 (06-27-23 @ 16:50), Max: 37.1 (06-27-23 @ 15:09)  T(F): 98.6 (06-27-23 @ 16:50), Max: 98.7 (06-27-23 @ 15:09)  HR: 92 (06-27-23 @ 16:50) (91 - 106)  BP: 141/87 (06-27-23 @ 16:50) (130/83 - 141/87)  BP(mean): --  RR: 18 (06-27-23 @ 16:50) (17 - 18)  SpO2: 98% (06-27-23 @ 16:50) (97% - 98%)     Vital Signs Last 24 Hrs  T(C): 36.7 (06-28-23 @ 08:53), Max: 37.1 (06-27-23 @ 15:09)  T(F): 98.1 (06-28-23 @ 08:53), Max: 98.7 (06-27-23 @ 15:09)  HR: 97 (06-28-23 @ 08:53) (91 - 97)  BP: 147/96 (06-28-23 @ 08:53) (130/83 - 147/96)  BP(mean): --  RR: 18 (06-28-23 @ 08:53) (17 - 18)  SpO2: 99% (06-28-23 @ 08:53) (98% - 99%)     Vital Signs Last 24 Hrs  T(C): 36.7 (06-28-23 @ 08:53), Max: 37 (06-27-23 @ 16:50)  T(F): 98.1 (06-28-23 @ 08:53), Max: 98.6 (06-27-23 @ 16:50)  HR: 97 (06-28-23 @ 08:53) (92 - 97)  BP: 147/96 (06-28-23 @ 08:53) (141/87 - 147/96)  BP(mean): --  RR: 18 (06-28-23 @ 08:53) (18 - 18)  SpO2: 99% (06-28-23 @ 08:53) (98% - 99%)

## 2023-06-28 NOTE — BH INPATIENT PSYCHIATRY ASSESSMENT NOTE - NSBHPHYSICALEXAM_PSY_ALL_CORE
General: NAD, well appearing, right breast tenderness 4/10  Cards: RRR, no m/r/g, normal S1/S2  Pulm: clear to auscultation b/l  Abdominal: normoactive BS, no TTP  Extremities: no edema or cyanosis, pain to palpation b/l General: NAD, well appearing, right breast tenderness 4/10  Cards: RRR, no m/r/g, normal S1/S2  Pulm: clear to auscultation b/l  Abdominal: normoactive BS, no TTP  Extremities: no edema or cyanosis, 4/10 tenderness to palpation b/l calves

## 2023-06-28 NOTE — DIETITIAN INITIAL EVALUATION ADULT - PERTINENT MEDS FT
MEDICATIONS  (STANDING):  ARIPiprazole 5 milliGRAM(s) Oral daily  QUEtiapine 50 milliGRAM(s) Oral at bedtime    MEDICATIONS  (PRN):  acetaminophen     Tablet .. 650 milliGRAM(s) Oral every 6 hours PRN Temp greater or equal to 38C (100.4F), Mild Pain (1 - 3), Moderate Pain (4 - 6), Severe Pain (7 - 10)  aluminum hydroxide/magnesium hydroxide/simethicone Suspension 30 milliLiter(s) Oral every 6 hours PRN Dyspepsia  diphenhydrAMINE 50 milliGRAM(s) Oral at bedtime PRN Insomnia  haloperidol     Tablet 5 milliGRAM(s) Oral every 6 hours PRN agitation  LORazepam     Tablet 2 milliGRAM(s) Oral every 6 hours PRN anxiety  magnesium hydroxide Suspension 30 milliLiter(s) Oral daily PRN Constipation

## 2023-06-28 NOTE — DIETITIAN INITIAL EVALUATION ADULT - ADD RECOMMEND
home
1. Continue with diet order   2. Encourage pt to meet nutritional needs as able   3. Monitor labs: electrolytes, cmp  4. Monitor weights   5. Pain and bowel regimen per team   6. Will continue to assess/honor food preferences as able  7. Monitor adherence to diet education

## 2023-06-29 DIAGNOSIS — F10.20 ALCOHOL DEPENDENCE, UNCOMPLICATED: ICD-10-CM

## 2023-06-29 LAB
A1C WITH ESTIMATED AVERAGE GLUCOSE RESULT: 4.7 % — SIGNIFICANT CHANGE UP (ref 4–5.6)
ESTIMATED AVERAGE GLUCOSE: 88 MG/DL — SIGNIFICANT CHANGE UP (ref 68–114)

## 2023-06-29 PROCEDURE — 99233 SBSQ HOSP IP/OBS HIGH 50: CPT

## 2023-06-29 RX ORDER — BENZTROPINE MESYLATE 1 MG
1 TABLET ORAL DAILY
Refills: 0 | Status: DISCONTINUED | OUTPATIENT
Start: 2023-06-29 | End: 2023-07-13

## 2023-06-29 RX ADMIN — Medication 1 PATCH: at 09:41

## 2023-06-29 RX ADMIN — Medication 1 PATCH: at 19:06

## 2023-06-29 RX ADMIN — Medication 50 MILLIGRAM(S): at 23:37

## 2023-06-29 RX ADMIN — QUETIAPINE FUMARATE 50 MILLIGRAM(S): 200 TABLET, FILM COATED ORAL at 22:12

## 2023-06-29 RX ADMIN — ARIPIPRAZOLE 5 MILLIGRAM(S): 15 TABLET ORAL at 09:40

## 2023-06-30 PROCEDURE — 99233 SBSQ HOSP IP/OBS HIGH 50: CPT

## 2023-06-30 RX ORDER — ARIPIPRAZOLE 15 MG/1
10 TABLET ORAL DAILY
Refills: 0 | Status: DISCONTINUED | OUTPATIENT
Start: 2023-07-01 | End: 2023-07-05

## 2023-06-30 RX ORDER — ARIPIPRAZOLE 15 MG/1
5 TABLET ORAL ONCE
Refills: 0 | Status: COMPLETED | OUTPATIENT
Start: 2023-06-30 | End: 2023-06-30

## 2023-06-30 RX ADMIN — ARIPIPRAZOLE 5 MILLIGRAM(S): 15 TABLET ORAL at 09:31

## 2023-06-30 RX ADMIN — Medication 1 PATCH: at 19:05

## 2023-06-30 RX ADMIN — ARIPIPRAZOLE 5 MILLIGRAM(S): 15 TABLET ORAL at 10:33

## 2023-06-30 RX ADMIN — Medication 50 MILLIGRAM(S): at 21:52

## 2023-06-30 RX ADMIN — Medication 1 PATCH: at 09:32

## 2023-06-30 RX ADMIN — Medication 1 PATCH: at 09:31

## 2023-06-30 RX ADMIN — QUETIAPINE FUMARATE 50 MILLIGRAM(S): 200 TABLET, FILM COATED ORAL at 21:52

## 2023-06-30 NOTE — PROVIDER CONTACT NOTE (CHANGE IN STATUS NOTIFICATION) - BACKGROUND
Patient does not have a history of hypertension. Patient does not have a history of hypertension. Prior bp wnl

## 2023-07-01 LAB
BASOPHILS # BLD AUTO: 0.09 K/UL — SIGNIFICANT CHANGE UP (ref 0–0.2)
BASOPHILS NFR BLD AUTO: 1.3 % — SIGNIFICANT CHANGE UP (ref 0–2)
CALCIUM SERPL-MCNC: 9.4 MG/DL — SIGNIFICANT CHANGE UP (ref 8.4–10.5)
CHLORIDE SERPL-SCNC: 105 MMOL/L — SIGNIFICANT CHANGE UP (ref 96–108)
CREAT SERPL-MCNC: 0.7 MG/DL — SIGNIFICANT CHANGE UP (ref 0.5–1.3)
EGFR: 112 ML/MIN/1.73M2 — SIGNIFICANT CHANGE UP
EOSINOPHIL # BLD AUTO: 0.06 K/UL — SIGNIFICANT CHANGE UP (ref 0–0.5)
EOSINOPHIL NFR BLD AUTO: 0.9 % — SIGNIFICANT CHANGE UP (ref 0–6)
GLUCOSE SERPL-MCNC: 116 MG/DL — HIGH (ref 70–99)
HCT VFR BLD CALC: 28.5 % — LOW (ref 34.5–45)
HGB BLD-MCNC: 8.1 G/DL — LOW (ref 11.5–15.5)
IMM GRANULOCYTES NFR BLD AUTO: 0.1 % — SIGNIFICANT CHANGE UP (ref 0–0.9)
LACTATE SERPL-SCNC: 1.2 MMOL/L — SIGNIFICANT CHANGE UP (ref 0.5–2)
LYMPHOCYTES # BLD AUTO: 2.66 K/UL — SIGNIFICANT CHANGE UP (ref 1–3.3)
LYMPHOCYTES # BLD AUTO: 38.5 % — SIGNIFICANT CHANGE UP (ref 13–44)
MAGNESIUM SERPL-MCNC: 1.9 MG/DL — SIGNIFICANT CHANGE UP (ref 1.6–2.6)
MCHC RBC-ENTMCNC: 20 PG — LOW (ref 27–34)
MCHC RBC-ENTMCNC: 28.4 GM/DL — LOW (ref 32–36)
MCV RBC AUTO: 70.5 FL — LOW (ref 80–100)
MONOCYTES # BLD AUTO: 0.81 K/UL — SIGNIFICANT CHANGE UP (ref 0–0.9)
MONOCYTES NFR BLD AUTO: 11.7 % — SIGNIFICANT CHANGE UP (ref 2–14)
NEUTROPHILS # BLD AUTO: 3.28 K/UL — SIGNIFICANT CHANGE UP (ref 1.8–7.4)
NEUTROPHILS NFR BLD AUTO: 47.5 % — SIGNIFICANT CHANGE UP (ref 43–77)
NRBC # BLD: 0 /100 WBCS — SIGNIFICANT CHANGE UP (ref 0–0)
PHOSPHATE SERPL-MCNC: 4 MG/DL — SIGNIFICANT CHANGE UP (ref 2.5–4.5)
PLATELET # BLD AUTO: 327 K/UL — SIGNIFICANT CHANGE UP (ref 150–400)
POTASSIUM SERPL-MCNC: 4.6 MMOL/L — SIGNIFICANT CHANGE UP (ref 3.5–5.3)
POTASSIUM SERPL-SCNC: 4.6 MMOL/L — SIGNIFICANT CHANGE UP (ref 3.5–5.3)
RBC # BLD: 4.04 M/UL — SIGNIFICANT CHANGE UP (ref 3.8–5.2)
RBC # FLD: 22.9 % — HIGH (ref 10.3–14.5)
SODIUM SERPL-SCNC: 135 MMOL/L — SIGNIFICANT CHANGE UP (ref 135–145)
WBC # BLD: 6.91 K/UL — SIGNIFICANT CHANGE UP (ref 3.8–10.5)
WBC # FLD AUTO: 6.91 K/UL — SIGNIFICANT CHANGE UP (ref 3.8–10.5)

## 2023-07-01 PROCEDURE — 99232 SBSQ HOSP IP/OBS MODERATE 35: CPT

## 2023-07-01 PROCEDURE — 99255 IP/OBS CONSLTJ NEW/EST HI 80: CPT

## 2023-07-01 RX ORDER — AMLODIPINE BESYLATE 2.5 MG/1
5 TABLET ORAL ONCE
Refills: 0 | Status: COMPLETED | OUTPATIENT
Start: 2023-07-01 | End: 2023-07-01

## 2023-07-01 RX ADMIN — Medication 2 MILLIGRAM(S): at 06:35

## 2023-07-01 RX ADMIN — HALOPERIDOL DECANOATE 5 MILLIGRAM(S): 100 INJECTION INTRAMUSCULAR at 15:40

## 2023-07-01 RX ADMIN — Medication 2 MILLIGRAM(S): at 15:41

## 2023-07-01 RX ADMIN — AMLODIPINE BESYLATE 5 MILLIGRAM(S): 2.5 TABLET ORAL at 17:31

## 2023-07-01 RX ADMIN — Medication 1 PATCH: at 15:32

## 2023-07-01 RX ADMIN — QUETIAPINE FUMARATE 50 MILLIGRAM(S): 200 TABLET, FILM COATED ORAL at 22:40

## 2023-07-01 RX ADMIN — Medication 1 PATCH: at 19:17

## 2023-07-01 RX ADMIN — ARIPIPRAZOLE 10 MILLIGRAM(S): 15 TABLET ORAL at 15:29

## 2023-07-01 NOTE — CONSULT NOTE ADULT - ATTENDING COMMENTS
39 yo F with PMHx bipolar disorder, hx SA, polysubstance use BIBEMS from home by family due to concern for manic behavior admitted to inpatient psych for further management. Medicine consulted for tachycardia and HTN.      #Tachycardia – HR ranging 120-140 (baseline  on admission). Could be 2/2 agitation and psychosis s/p PRN Haldol/Ativan PRN earlier in day. On exam, fast-speaking and reports anxiety. No CP, SOB/ARTEAGA. EKG NSR. Favor optimization of underlying psychiatric issues as etiology of tachycardia. Repeat EKG for HR >150.     #HTN – BP ranging 140s-160s/80s-110s since admission. No hx of HTN. Received Amlodipine 5mg x1 at 5PM. Would monitor and tx underlying anxiety. If remains persistently HTN/Tachycardic, could consider BB.      #Microcytic anemia – Hb 8.5 with MCV 70.5 on admission labs, unclear etiology. No active signs/sx of bleed. F/U iron studies, TSH, B12, folate.

## 2023-07-01 NOTE — BH CHART NOTE - NSEVENTNOTEFT_PSY_ALL_CORE
Spoke with medicine consult about elevated BP/HR.  Ordered EKG and labs as per medicine consult request. Pt will be seen by medicine later tonight.   Pt denies CP, palpitations, dizziness. Feels well.

## 2023-07-01 NOTE — CONSULT NOTE ADULT - SUBJECTIVE AND OBJECTIVE BOX
INTERNAL MEDICINE - INITIAL CONSULT NOTE    ABEBE MONGE  [id number]    Patient is a 38y old  Female who presents with a chief complaint of SCHIZOPHRENIA     (28 Jun 2023 15:33)      HPI: Patient is a 37 yo F, domiciled with family, unemployed, history of bipolar disorder, alcohol use (3 glasses of wine per night, last use 1-2 weeks ago), nicotine use disorder (jewel, 1 pod per day since 17 years old) who was brought in by family for manic behavior, currently admitted to inpatient psych. Med consulted for hypertension and tachycardia. Patient reports current anxiety and inability to sleep at night, even with medications seroquel and benadryl. She states she usually has hypotension according to outpatient PCP. In terms of other PMH, patient denies any home medications.         PAST MEDICAL & SURGICAL HISTORY:  H/O schizophrenia          acetaminophen     Tablet .. 650 milliGRAM(s) Oral every 6 hours PRN  aluminum hydroxide/magnesium hydroxide/simethicone Suspension 30 milliLiter(s) Oral every 6 hours PRN  ARIPiprazole 10 milliGRAM(s) Oral daily  benztropine 1 milliGRAM(s) Oral daily PRN  diphenhydrAMINE 50 milliGRAM(s) Oral at bedtime PRN  haloperidol     Tablet 5 milliGRAM(s) Oral every 6 hours PRN  LORazepam     Tablet 2 milliGRAM(s) Oral every 6 hours PRN  magnesium hydroxide Suspension 30 milliLiter(s) Oral daily PRN  nicotine  Polacrilex Gum 2 milliGRAM(s) Oral every 2 hours PRN  nicotine -  14 mG/24Hr(s) Patch 1 Patch Transdermal daily  QUEtiapine 50 milliGRAM(s) Oral at bedtime      FAMILY HISTORY:      REVIEW OF SYSTEMS:  CONSTITUTIONAL: No weakness, fever, or chills  EYES: No eye pain or discharge  ENMT:  No sinus or throat pain  NECK: No pain or stiffness  RESPIRATORY: No cough, wheezing, chills or hemoptysis; No shortness of breath  CARDIOVASCULAR: No chest pain, palpitations, dizziness, or leg swelling  GASTROINTESTINAL: No abdominal or epigastric pain. No nausea, vomiting, or hematemesis; No diarrhea or constipation. No melena or hematochezia.  GENITOURINARY: No dysuria or incontinence  NEUROLOGICAL: No headaches, memory loss, loss of strength, numbness, or tremors  SKIN: No new rashes  MUSCULOSKELETAL: No joint pain or swelling; No muscle, back, or extremity pain  HEME/LYMPH: No easy bruising, or bleeding gums      PHYSICAL EXAM:  T(C): 37.1 (07-01-23 @ 21:04), Max: 37.1 (07-01-23 @ 16:04)  HR: 123 (07-01-23 @ 21:04) (119 - 150)  BP: 159/88 (07-01-23 @ 21:04) (149/103 - 168/116)  RR: 18 (07-01-23 @ 21:04) (16 - 18)  SpO2: 99% (07-01-23 @ 21:04) (98% - 99%)    General: anxious appearing, speaking in full sentences  HEENT: head NC/AT, no conjunctival injection, EOMI, MMM  Neck: supple, no JVD  Cardio: tachycardic, +S1/S2, no M/R/G  Resp: lungs CTAB, no cough/wheezes/rales/rhonchi  Abdo: soft, NT, ND, +bowel sounds x4, no organomegaly or palpable mass    Extremities: WWP, no edema/cyanosis/clubbing   Vasc: 2+ radial and DP pulses b/l  Neuro: A&Ox3  Psych: speech non-pressured, thoughts goal-oriented  Skin: dry, intact, no visible jaundice   MSK: no joint swelling      Consultant(s) Notes Reviewed:  [x ] YES  [ ] NO  Care Discussed with Consultants/Other Providers [ x] YES  [ ] NO    LABS:          RADIOLOGY & ADDITIONAL TESTS:    Imaging Personally Reviewed:  [X] YES  [ ] NO

## 2023-07-01 NOTE — CONSULT NOTE ADULT - ASSESSMENT
Patient is a 39 yo F, domiciled with family, unemployed, history of bipolar disorder, alcohol use (3 glasses of wine per night, last use 1-2 weeks ago), nicotine use disorder (jewel, 1 pod per day since 17 years old) who was brought in by family for manic behavior, currently admitted to inpatient psych. Med consulted for hypertension and tachycardia.     #HTN and tachycardia  #anemia  Patient with BP range since admission 150-170/; -150  During encounter, patient was anxious. EKG with sinus tachycardia   Patient with microcytic anemia. Patient denies hematuria, hemoptysis, melena, BRBPR  Patient with excessive alcohol consumption  on PE, patient without atrophic glossitis, angular chelitis, koilonychia, or pallor  - order iron labs (iron, TIBC, ferritin), reticulocyte count  - although microcytic, order b12 and folate given alcohol consumption  - patient states she has had prior hx of hypothyroidism, not on any medications- order TSH  - daily CBC  - maintain active T&S  - transfuse if Hgb <7 (if symtomatic or cardiac hx, threshold <7.5)   Patient is a 39 yo F, domiciled with family, unemployed, history of bipolar disorder, alcohol use (3 glasses of wine per night, last use 1-2 weeks ago), nicotine use disorder (jewel, 1 pod per day since 17 years old) who was brought in by family for manic behavior, currently admitted to inpatient psych. Med consulted for hypertension and tachycardia.     #HTN   Patient with BP range since admission 150-170/  According to patient, she is usually hypotensive. She does not take any medications at home. Given current hospitalization for manic episode and patient states she feels anxious, most likely driving her HTN.     Plan  - continue to monitor and treat for manic episode. If continues to be hypertensive, can consider starting BBlocker (given tachycardia)    #tachycardia  Patient with HR range 120-150 since admission. Given current hospitalization for manic episode and patient states she feels anxious, most likely driving her tachycardia vs anemia vs nicotine withdrawal  During encounter, patient was anxious. EKG with sinus tachycardia. Anemia could also be driving her tachycardia    Plan  - continue to monitor and treat for manic episode  - if HR >150, get EKG and will consider starting BBlocker  - ensure nicotine replacement is adequate     #anemia  Patient with microcytic anemia. Patient denies hematuria, hemoptysis, melena, BRBPR  Patient with excessive alcohol consumption  on PE, patient without atrophic glossitis, angular chelitis, koilonychia, or pallor    Plan  - order iron labs (iron, TIBC, ferritin), reticulocyte count  - although microcytic, order b12 and folate given alcohol consumption  - patient states she has had prior hx of hypothyroidism, not on any medications- order TSH  - daily CBC  - maintain active T&S  - transfuse if Hgb <7 (if symptomatic or cardiac hx, threshold <7.5)    #nicotine use disorder  Patient with 1 pod per day (jewel) and smoking hx since 18 yo. Nicotine withdrawal can also cause tachycardia    Plan  - make sure current nicotine replacement regimen is sufficient for patient

## 2023-07-02 LAB
ALBUMIN SERPL ELPH-MCNC: 4.1 G/DL — SIGNIFICANT CHANGE UP (ref 3.3–5)
ALP SERPL-CCNC: 48 U/L — SIGNIFICANT CHANGE UP (ref 40–120)
ALT FLD-CCNC: 69 U/L — HIGH (ref 10–45)
ANION GAP SERPL CALC-SCNC: 10 MMOL/L — SIGNIFICANT CHANGE UP (ref 5–17)
AST SERPL-CCNC: 36 U/L — SIGNIFICANT CHANGE UP (ref 10–40)
BILIRUB SERPL-MCNC: 0.2 MG/DL — SIGNIFICANT CHANGE UP (ref 0.2–1.2)
BUN SERPL-MCNC: 6 MG/DL — LOW (ref 7–23)
CO2 SERPL-SCNC: 20 MMOL/L — LOW (ref 22–31)
PROT SERPL-MCNC: 8.1 G/DL — SIGNIFICANT CHANGE UP (ref 6–8.3)

## 2023-07-02 PROCEDURE — 99232 SBSQ HOSP IP/OBS MODERATE 35: CPT

## 2023-07-02 RX ORDER — QUETIAPINE FUMARATE 200 MG/1
50 TABLET, FILM COATED ORAL ONCE
Refills: 0 | Status: DISCONTINUED | OUTPATIENT
Start: 2023-07-02 | End: 2023-07-13

## 2023-07-02 RX ORDER — NIFEDIPINE 30 MG
30 TABLET, EXTENDED RELEASE 24 HR ORAL DAILY
Refills: 0 | Status: DISCONTINUED | OUTPATIENT
Start: 2023-07-02 | End: 2023-07-04

## 2023-07-02 RX ADMIN — Medication 1 PATCH: at 09:31

## 2023-07-02 RX ADMIN — Medication 1 PATCH: at 09:43

## 2023-07-02 RX ADMIN — Medication 2 MILLIGRAM(S): at 20:17

## 2023-07-02 RX ADMIN — Medication 1 PATCH: at 19:00

## 2023-07-02 RX ADMIN — ARIPIPRAZOLE 10 MILLIGRAM(S): 15 TABLET ORAL at 09:31

## 2023-07-02 RX ADMIN — HALOPERIDOL DECANOATE 5 MILLIGRAM(S): 100 INJECTION INTRAMUSCULAR at 20:17

## 2023-07-02 RX ADMIN — Medication 30 MILLIGRAM(S): at 09:32

## 2023-07-02 RX ADMIN — Medication 50 MILLIGRAM(S): at 20:17

## 2023-07-02 RX ADMIN — Medication 1 PATCH: at 07:03

## 2023-07-02 NOTE — PROGRESS NOTE ADULT - ASSESSMENT
39 yo F, domiciled with family, unemployed, history of bipolar disorder, alcohol use (3 glasses of wine per night, last use 1-2 weeks ago), nicotine use disorder (jewel, 1 pod per day since 17 years old) who was brought in by family for manic behavior, currently admitted to inpatient psych. Med consulted for hypertension and tachycardia.     ambulating inside the room, felt palpitation.  she was given amlodipine 5 mg yesterday evening,  reported no history of Hypertension in the past.  exam - RRR with tachycardia, lungs clear , no edema on lower ext.    - Elevated BP with tachycardia on a couple of reading, may be undiagnosed HTN.    HTN with tachycardia, manic episode might be contributory, no sign of heart failure.   - to stop amlodipine  - start Nifedipine ER 30 mg po q daily 7/2- to follow BP/HR and titrate up  rec explianed to patient she is in agreement with the plan     thank you for allowing medicine to participate in the care, milo  RN and  resident.  DR. Jama Moreno covering 7/3-7/10

## 2023-07-02 NOTE — PROGRESS NOTE ADULT - SUBJECTIVE AND OBJECTIVE BOX
Vital Signs Last 24 Hrs  T(C): 37.1 (01 Jul 2023 21:04), Max: 37.1 (01 Jul 2023 16:04)  T(F): 98.8 (01 Jul 2023 21:04), Max: 98.8 (01 Jul 2023 21:04)  HR: 123 (01 Jul 2023 21:04) (119 - 123)  BP: 159/88 (01 Jul 2023 21:04) (158/104 - 168/116)  BP(mean): --  ABP: --  ABP(mean): --  RR: 18 (01 Jul 2023 21:04) (18 - 18)  SpO2: 99% (01 Jul 2023 21:04) (99% - 99%)    O2 Parameters below as of 01 Jul 2023 19:35  Patient On (Oxygen Delivery Method): room air

## 2023-07-03 PROCEDURE — 99232 SBSQ HOSP IP/OBS MODERATE 35: CPT | Mod: GC

## 2023-07-03 PROCEDURE — 99232 SBSQ HOSP IP/OBS MODERATE 35: CPT

## 2023-07-03 RX ADMIN — Medication 30 MILLIGRAM(S): at 10:14

## 2023-07-03 RX ADMIN — Medication 1 PATCH: at 05:44

## 2023-07-03 RX ADMIN — Medication 650 MILLIGRAM(S): at 21:52

## 2023-07-03 RX ADMIN — Medication 2 MILLIGRAM(S): at 17:44

## 2023-07-03 RX ADMIN — ARIPIPRAZOLE 10 MILLIGRAM(S): 15 TABLET ORAL at 10:13

## 2023-07-03 RX ADMIN — Medication 1 PATCH: at 10:14

## 2023-07-03 RX ADMIN — Medication 1 PATCH: at 11:53

## 2023-07-03 RX ADMIN — QUETIAPINE FUMARATE 50 MILLIGRAM(S): 200 TABLET, FILM COATED ORAL at 21:53

## 2023-07-03 NOTE — PROGRESS NOTE ADULT - ASSESSMENT
Patient is a 39 yo F, domiciled with family, unemployed, history of bipolar disorder, alcohol use (3 glasses of wine per night, last use 1-2 weeks ago), nicotine use disorder (jewel, 1 pod per day since 17 years old) who was brought in by family for manic behavior, currently admitted to inpatient psych. Med consulted for hypertension and tachycardia.     #HTN   #Tachycardia   Patient with BP range since admission 150-170/. Not on any PO medications at home. Pt currently hospitalized for manic episode and anxiety which is likely contributing to HTN and tachycardia.    - continue to monitor and treat for manic episode  - continue with PO Nifedipine 30mg once daily  - if patient with persistently elevated SBP >160, would increase dose to Nifedipine 60mg once daily (Hold for SBP <90 and HR <60).     #anemia  Patient with microcytic anemia. Patient denies hematuria, hemoptysis, melena, BRBPR  Patient with excessive alcohol consumption  - order iron labs (iron, TIBC, ferritin), reticulocyte count  - although microcytic, order b12 and folate given alcohol consumption  - patient states she has had prior hx of hypothyroidism, not on any medications- order TSH  - daily CBC  - maintain active T&S  - transfuse if Hgb <7 (if symptomatic or cardiac hx, threshold <7.5)    Medicine to continue to follow

## 2023-07-04 LAB
ALBUMIN SERPL ELPH-MCNC: 4.2 G/DL — SIGNIFICANT CHANGE UP (ref 3.3–5)
ALP SERPL-CCNC: 45 U/L — SIGNIFICANT CHANGE UP (ref 40–120)
ALT FLD-CCNC: 58 U/L — HIGH (ref 10–45)
ANION GAP SERPL CALC-SCNC: 11 MMOL/L — SIGNIFICANT CHANGE UP (ref 5–17)
AST SERPL-CCNC: 31 U/L — SIGNIFICANT CHANGE UP (ref 10–40)
BILIRUB SERPL-MCNC: 0.4 MG/DL — SIGNIFICANT CHANGE UP (ref 0.2–1.2)
BUN SERPL-MCNC: 8 MG/DL — SIGNIFICANT CHANGE UP (ref 7–23)
CALCIUM SERPL-MCNC: 8.9 MG/DL — SIGNIFICANT CHANGE UP (ref 8.4–10.5)
CHLORIDE SERPL-SCNC: 104 MMOL/L — SIGNIFICANT CHANGE UP (ref 96–108)
CO2 SERPL-SCNC: 22 MMOL/L — SIGNIFICANT CHANGE UP (ref 22–31)
CREAT SERPL-MCNC: 0.76 MG/DL — SIGNIFICANT CHANGE UP (ref 0.5–1.3)
EGFR: 102 ML/MIN/1.73M2 — SIGNIFICANT CHANGE UP
GLUCOSE SERPL-MCNC: 105 MG/DL — HIGH (ref 70–99)
HCT VFR BLD CALC: 27.4 % — LOW (ref 34.5–45)
HGB BLD-MCNC: 7.7 G/DL — LOW (ref 11.5–15.5)
MCHC RBC-ENTMCNC: 20 PG — LOW (ref 27–34)
MCHC RBC-ENTMCNC: 28.1 GM/DL — LOW (ref 32–36)
MCV RBC AUTO: 71.2 FL — LOW (ref 80–100)
NRBC # BLD: 0 /100 WBCS — SIGNIFICANT CHANGE UP (ref 0–0)
PLATELET # BLD AUTO: 366 K/UL — SIGNIFICANT CHANGE UP (ref 150–400)
POTASSIUM SERPL-MCNC: 3.8 MMOL/L — SIGNIFICANT CHANGE UP (ref 3.5–5.3)
POTASSIUM SERPL-SCNC: 3.8 MMOL/L — SIGNIFICANT CHANGE UP (ref 3.5–5.3)
PROT SERPL-MCNC: 7.3 G/DL — SIGNIFICANT CHANGE UP (ref 6–8.3)
RBC # BLD: 3.85 M/UL — SIGNIFICANT CHANGE UP (ref 3.8–5.2)
RBC # FLD: 22.9 % — HIGH (ref 10.3–14.5)
SODIUM SERPL-SCNC: 137 MMOL/L — SIGNIFICANT CHANGE UP (ref 135–145)
WBC # BLD: 5.98 K/UL — SIGNIFICANT CHANGE UP (ref 3.8–10.5)
WBC # FLD AUTO: 5.98 K/UL — SIGNIFICANT CHANGE UP (ref 3.8–10.5)

## 2023-07-04 PROCEDURE — 99232 SBSQ HOSP IP/OBS MODERATE 35: CPT

## 2023-07-04 RX ORDER — NIFEDIPINE 30 MG
30 TABLET, EXTENDED RELEASE 24 HR ORAL
Refills: 0 | Status: DISCONTINUED | OUTPATIENT
Start: 2023-07-04 | End: 2023-07-06

## 2023-07-04 RX ADMIN — Medication 30 MILLIGRAM(S): at 14:36

## 2023-07-04 RX ADMIN — Medication 1 PATCH: at 07:35

## 2023-07-04 RX ADMIN — Medication 1 PATCH: at 10:51

## 2023-07-04 RX ADMIN — Medication 1 PATCH: at 19:44

## 2023-07-04 RX ADMIN — QUETIAPINE FUMARATE 50 MILLIGRAM(S): 200 TABLET, FILM COATED ORAL at 21:54

## 2023-07-04 RX ADMIN — Medication 1 PATCH: at 10:52

## 2023-07-04 RX ADMIN — Medication 2 MILLIGRAM(S): at 10:51

## 2023-07-04 RX ADMIN — ARIPIPRAZOLE 10 MILLIGRAM(S): 15 TABLET ORAL at 10:51

## 2023-07-04 NOTE — PROGRESS NOTE ADULT - ASSESSMENT
39 yo F, domiciled with family, unemployed, history of bipolar disorder, alcohol use (3 glasses of wine per night, last use 1-2 weeks ago), nicotine use disorder (jewel, 1 pod per day since 17 years old) who was brought in by family for manic behavior, currently admitted to inpatient psych. Med consulted for hypertension and tachycardia.     # HTN with tachycardia  - manic episode might be contributory, no sign of heart failure.   - Amlodipine stopped  - started Nifedipine ER 30 mg po daily(7/2-), BP ~ 161/93, will change Nifedipine ER 30mg po bid ( 7/4)    # Bipolar d/o   # Manic episode  - active management per Psychiatry team   - ARIPiprazole 10 milliGRAM(s) Oral daily  - QUEtiapine 50 milliGRAM(s) Oral at bedtime  benztropine 1 milliGRAM(s) Oral daily PRN Antipsychotic induced akathisia  diphenhydrAMINE 50 milliGRAM(s) Oral at bedtime PRN Insomnia  haloperidol     Tablet 5 milliGRAM(s) Oral every 6 hours PRN agitation  LORazepam     Tablet 2 milliGRAM(s) Oral every 6 hours PRN anxiety    # Cig smoking  nicotine  Polacrilex Gum 2 milliGRAM(s) Oral every 2 hours PRN nrt    thank you for allowing medicine to participate in the care, milo BERMUDEZ RN .

## 2023-07-04 NOTE — PROGRESS NOTE ADULT - SUBJECTIVE AND OBJECTIVE BOX
Patient is a 38y old  Female who presents with a chief complaint of psych admission (01 Jul 2023 23:29)    INTERVAL EVENTS: NAEON     SUBJECTIVE:  Patient was seen and examined at bedside. no issues     Review of systems: No fever, chills, dizziness, HA, Changes in vision, CP, dyspnea, nausea or vomiting, dysuria, changes in bowel movements, LE edema. Rest of 12 point Review of systems negative unless otherwise documented elsewhere in note.     Diet, Regular (06-27-23 @ 18:16) [Active]      MEDICATIONS:  MEDICATIONS  (STANDING):  ARIPiprazole 10 milliGRAM(s) Oral daily  nicotine -  14 mG/24Hr(s) Patch 1 Patch Transdermal daily  NIFEdipine XL 30 milliGRAM(s) Oral daily  QUEtiapine 50 milliGRAM(s) Oral at bedtime  QUEtiapine 50 milliGRAM(s) Oral once    MEDICATIONS  (PRN):  acetaminophen     Tablet .. 650 milliGRAM(s) Oral every 6 hours PRN Temp greater or equal to 38C (100.4F), Mild Pain (1 - 3), Moderate Pain (4 - 6), Severe Pain (7 - 10)  aluminum hydroxide/magnesium hydroxide/simethicone Suspension 30 milliLiter(s) Oral every 6 hours PRN Dyspepsia  benztropine 1 milliGRAM(s) Oral daily PRN Antipsychotic induced akathisia  diphenhydrAMINE 50 milliGRAM(s) Oral at bedtime PRN Insomnia  haloperidol     Tablet 5 milliGRAM(s) Oral every 6 hours PRN agitation  LORazepam     Tablet 2 milliGRAM(s) Oral every 6 hours PRN anxiety  magnesium hydroxide Suspension 30 milliLiter(s) Oral daily PRN Constipation  nicotine  Polacrilex Gum 2 milliGRAM(s) Oral every 2 hours PRN nrt      Allergies    amoxicillin (Rash)    Intolerances        OBJECTIVE:  Vital Signs Last 24 Hrs  T(C): 36.7 (04 Jul 2023 10:32), Max: 36.9 (03 Jul 2023 16:51)  T(F): 98.1 (04 Jul 2023 10:32), Max: 98.5 (03 Jul 2023 16:51)  HR: 108 (04 Jul 2023 10:32) (106 - 118)  BP: 161/93 (04 Jul 2023 10:32) (140/81 - 187/90)  BP(mean): --  RR: 18 (04 Jul 2023 10:32) (18 - 18)  SpO2: 99% (04 Jul 2023 10:32) (97% - 99%)    Parameters below as of 03 Jul 2023 20:10  Patient On (Oxygen Delivery Method): room air      I&O's Summary      PHYSICAL EXAM:  Gen: Reclining in bed at time of exam, appears stated age  HEENT: NCAT, MMM, clear OP  Neck: supple, trachea at midline  CV: RRR, +S1/S2  Pulm: adequate respiratory effort, no increase in work of breathing  Abd: soft, NTND  Skin: warm and dry,   Ext: WWP, no LE edema  Neuro: AOx3, no gross focal neurological deficits  Psych: affect and behavior appropriate, pleasant at time of interview  :     LABS:                        7.7    5.98  )-----------( 366      ( 04 Jul 2023 06:48 )             27.4     07-04    137  |  104  |  8   ----------------------------<  105<H>  3.8   |  22  |  0.76    Ca    8.9      04 Jul 2023 06:48    TPro  7.3  /  Alb  4.2  /  TBili  0.4  /  DBili  x   /  AST  31  /  ALT  58<H>  /  AlkPhos  45  07-04    LIVER FUNCTIONS - ( 04 Jul 2023 06:48 )  Alb: 4.2 g/dL / Pro: 7.3 g/dL / ALK PHOS: 45 U/L / ALT: 58 U/L / AST: 31 U/L / GGT: x             CAPILLARY BLOOD GLUCOSE        Urinalysis Basic - ( 04 Jul 2023 06:48 )    Color: x / Appearance: x / SG: x / pH: x  Gluc: 105 mg/dL / Ketone: x  / Bili: x / Urobili: x   Blood: x / Protein: x / Nitrite: x   Leuk Esterase: x / RBC: x / WBC x   Sq Epi: x / Non Sq Epi: x / Bacteria: x        MICRODATA:      RADIOLOGY/OTHER STUDIES:

## 2023-07-05 PROCEDURE — 99233 SBSQ HOSP IP/OBS HIGH 50: CPT

## 2023-07-05 PROCEDURE — 99232 SBSQ HOSP IP/OBS MODERATE 35: CPT | Mod: GC

## 2023-07-05 RX ORDER — ARIPIPRAZOLE 15 MG/1
15 TABLET ORAL DAILY
Refills: 0 | Status: DISCONTINUED | OUTPATIENT
Start: 2023-07-05 | End: 2023-07-10

## 2023-07-05 RX ORDER — GABAPENTIN 400 MG/1
300 CAPSULE ORAL THREE TIMES A DAY
Refills: 0 | Status: DISCONTINUED | OUTPATIENT
Start: 2023-07-05 | End: 2023-07-13

## 2023-07-05 RX ADMIN — HALOPERIDOL DECANOATE 5 MILLIGRAM(S): 100 INJECTION INTRAMUSCULAR at 11:02

## 2023-07-05 RX ADMIN — Medication 1 PATCH: at 18:32

## 2023-07-05 RX ADMIN — Medication 650 MILLIGRAM(S): at 18:33

## 2023-07-05 RX ADMIN — Medication 1 PATCH: at 11:01

## 2023-07-05 RX ADMIN — ARIPIPRAZOLE 15 MILLIGRAM(S): 15 TABLET ORAL at 10:59

## 2023-07-05 RX ADMIN — Medication 650 MILLIGRAM(S): at 16:27

## 2023-07-05 RX ADMIN — Medication 30 MILLIGRAM(S): at 21:03

## 2023-07-05 RX ADMIN — Medication 30 MILLIGRAM(S): at 10:59

## 2023-07-05 RX ADMIN — Medication 2 MILLIGRAM(S): at 16:50

## 2023-07-05 RX ADMIN — GABAPENTIN 300 MILLIGRAM(S): 400 CAPSULE ORAL at 21:02

## 2023-07-05 RX ADMIN — QUETIAPINE FUMARATE 50 MILLIGRAM(S): 200 TABLET, FILM COATED ORAL at 21:02

## 2023-07-05 RX ADMIN — HALOPERIDOL DECANOATE 5 MILLIGRAM(S): 100 INJECTION INTRAMUSCULAR at 16:26

## 2023-07-05 RX ADMIN — Medication 1 PATCH: at 11:00

## 2023-07-05 NOTE — PROGRESS NOTE ADULT - SUBJECTIVE AND OBJECTIVE BOX
ABEBE MONGE, 38y, Female  MRN-[id number]  Patient is a 38y old  Female who presents with a chief complaint of psych admission (01 Jul 2023 23:29)      OVERNIGHT EVENTS: NAEO     SUBJECTIVE: Pt seen/examined at bedside. Pt stating she is doing well and has no complaints.     12 Point ROS Negative unless noted otherwise above.  -------------------------------------------------------------------------------  VITAL SIGNS:  Vital Signs Last 24 Hrs  T(C): 36.6 (05 Jul 2023 10:33), Max: 36.9 (05 Jul 2023 06:00)  T(F): 97.8 (05 Jul 2023 10:33), Max: 98.5 (05 Jul 2023 06:00)  HR: 108 (05 Jul 2023 10:33) (89 - 108)  BP: 162/116 (05 Jul 2023 10:34) (134/86 - 179/107)  BP(mean): --  RR: 18 (05 Jul 2023 10:33) (18 - 89)  SpO2: 100% (05 Jul 2023 10:33) (97% - 100%)    Parameters below as of 05 Jul 2023 06:00  Patient On (Oxygen Delivery Method): room air      I&O's Summary      PHYSICAL EXAM:    General: NAD, well developed  HEENT: NC/AT; EOMI, PERRLA, anicteric sclera; moist mucosal membranes.  Neck: supple, trachea midline  Cardiovascular: RRR, +S1/S2; NO M/R/G  Respiratory: CTA B/L; no W/R/R  Gastrointestinal: soft, NT/ND; +BSx4  Extremities: WWP; no edema or cyanosis  Vascular: 2+ radial, DP/PT pulses B/L  Neurological: AAOx3; no focal deficits    ALLERGIES:  Allergies    amoxicillin (Rash)    Intolerances        MEDICATIONS:  MEDICATIONS  (STANDING):  ARIPiprazole 15 milliGRAM(s) Oral daily  nicotine -  14 mG/24Hr(s) Patch 1 Patch Transdermal daily  NIFEdipine XL 30 milliGRAM(s) Oral two times a day  QUEtiapine 50 milliGRAM(s) Oral at bedtime  QUEtiapine 50 milliGRAM(s) Oral once    MEDICATIONS  (PRN):  acetaminophen     Tablet .. 650 milliGRAM(s) Oral every 6 hours PRN Temp greater or equal to 38C (100.4F), Mild Pain (1 - 3), Moderate Pain (4 - 6), Severe Pain (7 - 10)  aluminum hydroxide/magnesium hydroxide/simethicone Suspension 30 milliLiter(s) Oral every 6 hours PRN Dyspepsia  benztropine 1 milliGRAM(s) Oral daily PRN Antipsychotic induced akathisia  diphenhydrAMINE 50 milliGRAM(s) Oral at bedtime PRN Insomnia  haloperidol     Tablet 5 milliGRAM(s) Oral every 6 hours PRN agitation  magnesium hydroxide Suspension 30 milliLiter(s) Oral daily PRN Constipation  nicotine  Polacrilex Gum 2 milliGRAM(s) Oral every 2 hours PRN nrt      -------------------------------------------------------------------------------  LABS:                        7.7    5.98  )-----------( 366      ( 04 Jul 2023 06:48 )             27.4     07-04    137  |  104  |  8   ----------------------------<  105<H>  3.8   |  22  |  0.76    Ca    8.9      04 Jul 2023 06:48    TPro  7.3  /  Alb  4.2  /  TBili  0.4  /  DBili  x   /  AST  31  /  ALT  58<H>  /  AlkPhos  45  07-04    LIVER FUNCTIONS - ( 04 Jul 2023 06:48 )  Alb: 4.2 g/dL / Pro: 7.3 g/dL / ALK PHOS: 45 U/L / ALT: 58 U/L / AST: 31 U/L / GGT: x             Urinalysis Basic - ( 04 Jul 2023 06:48 )    Color: x / Appearance: x / SG: x / pH: x  Gluc: 105 mg/dL / Ketone: x  / Bili: x / Urobili: x   Blood: x / Protein: x / Nitrite: x   Leuk Esterase: x / RBC: x / WBC x   Sq Epi: x / Non Sq Epi: x / Bacteria: x      CAPILLARY BLOOD GLUCOSE              RADIOLOGY & ADDITIONAL TESTS: Reviewed.

## 2023-07-05 NOTE — PROGRESS NOTE ADULT - ASSESSMENT
37 yo F, domiciled with family, unemployed, history of bipolar disorder, alcohol use (3 glasses of wine per night, last use 1-2 weeks ago), nicotine use disorder (jewel, 1 pod per day since 17 years old) who was brought in by family for manic behavior, currently admitted to inpatient psych. Med consulted for hypertension and tachycardia.     # HTN with tachycardia -- IMPROVING   - manic episode might be contributory, no sign of heart failure.   - started Nifedipine ER 30 mg po BID on 7/4 with BPs WNL. Will continue with Nifedipine 30mg BID today  - beginning on 7/6, DC Nifedipine and switch to Amlodipine 10mg once daily (Hold for SBP <90 and/or HR <60)  - continue to monitor     # Bipolar d/o   # Manic episode  - active management per Psychiatry team   - ARIPiprazole 10 milliGRAM(s) Oral daily  - QUEtiapine 50 milliGRAM(s) Oral at bedtime  benztropine 1 milliGRAM(s) Oral daily PRN Antipsychotic induced akathisia  diphenhydrAMINE 50 milliGRAM(s) Oral at bedtime PRN Insomnia  haloperidol     Tablet 5 milliGRAM(s) Oral every 6 hours PRN agitation  LORazepam     Tablet 2 milliGRAM(s) Oral every 6 hours PRN anxiety    # Cig smoking  nicotine  Polacrilex Gum 2 milliGRAM(s) Oral every 2 hours PRN nrt    thank you for allowing medicine to participate in the care, milo BERMUDEZ RN .

## 2023-07-06 PROCEDURE — 99233 SBSQ HOSP IP/OBS HIGH 50: CPT

## 2023-07-06 PROCEDURE — 99232 SBSQ HOSP IP/OBS MODERATE 35: CPT | Mod: GC

## 2023-07-06 RX ORDER — AMLODIPINE BESYLATE 2.5 MG/1
10 TABLET ORAL DAILY
Refills: 0 | Status: DISCONTINUED | OUTPATIENT
Start: 2023-07-06 | End: 2023-07-13

## 2023-07-06 RX ORDER — SENNA PLUS 8.6 MG/1
1 TABLET ORAL AT BEDTIME
Refills: 0 | Status: DISCONTINUED | OUTPATIENT
Start: 2023-07-06 | End: 2023-07-13

## 2023-07-06 RX ADMIN — Medication 1 PATCH: at 12:16

## 2023-07-06 RX ADMIN — AMLODIPINE BESYLATE 10 MILLIGRAM(S): 2.5 TABLET ORAL at 12:56

## 2023-07-06 RX ADMIN — ARIPIPRAZOLE 15 MILLIGRAM(S): 15 TABLET ORAL at 11:50

## 2023-07-06 RX ADMIN — QUETIAPINE FUMARATE 50 MILLIGRAM(S): 200 TABLET, FILM COATED ORAL at 21:53

## 2023-07-06 RX ADMIN — GABAPENTIN 300 MILLIGRAM(S): 400 CAPSULE ORAL at 21:53

## 2023-07-06 RX ADMIN — GABAPENTIN 300 MILLIGRAM(S): 400 CAPSULE ORAL at 16:38

## 2023-07-06 RX ADMIN — GABAPENTIN 300 MILLIGRAM(S): 400 CAPSULE ORAL at 11:50

## 2023-07-06 RX ADMIN — Medication 1 PATCH: at 07:22

## 2023-07-06 NOTE — PROGRESS NOTE ADULT - ASSESSMENT
37 yo F, domiciled with family, unemployed, history of bipolar disorder, alcohol use (3 glasses of wine per night, last use 1-2 weeks ago), nicotine use disorder (jewel, 1 pod per day since 17 years old) who was brought in by family for manic behavior, currently admitted to inpatient psych. Med consulted for hypertension and tachycardia.     # HTN with tachycardia -- IMPROVING   - manic episode might be contributory, no sign of heart failure.   - DC Nifedipine ER 30 mg po BID and switch to Amlodipine 10mg once daily today 7/6 (Hold for SBP< 90 and/or HR <60)  - continue to monitor     # Bipolar d/o   # Manic episode  - active management per Psychiatry team   - ARIPiprazole 10 milliGRAM(s) Oral daily  - QUEtiapine 50 milliGRAM(s) Oral at bedtime  benztropine 1 milliGRAM(s) Oral daily PRN Antipsychotic induced akathisia  diphenhydrAMINE 50 milliGRAM(s) Oral at bedtime PRN Insomnia  haloperidol     Tablet 5 milliGRAM(s) Oral every 6 hours PRN agitation  LORazepam     Tablet 2 milliGRAM(s) Oral every 6 hours PRN anxiety    # Cig smoking  nicotine  Polacrilex Gum 2 milliGRAM(s) Oral every 2 hours PRN nrt    thank you for allowing medicine to participate in the care, milo BERMUDEZ RN .

## 2023-07-06 NOTE — PROGRESS NOTE ADULT - SUBJECTIVE AND OBJECTIVE BOX
ABEBE MONGE, 38y, Female  MRN-[id number]  Patient is a 38y old  Female who presents with a chief complaint of psych admission (01 Jul 2023 23:29)      OVERNIGHT EVENTS:     SUBJECTIVE:    12 Point ROS Negative unless noted otherwise above.  -------------------------------------------------------------------------------  VITAL SIGNS:  Vital Signs Last 24 Hrs  T(C): 36.7 (05 Jul 2023 16:52), Max: 36.7 (05 Jul 2023 16:52)  T(F): 98.1 (05 Jul 2023 16:52), Max: 98.1 (05 Jul 2023 16:52)  HR: 105 (05 Jul 2023 16:52) (105 - 108)  BP: 168/105 (05 Jul 2023 16:52) (162/116 - 179/107)  BP(mean): --  RR: 18 (05 Jul 2023 16:52) (18 - 18)  SpO2: 99% (05 Jul 2023 16:52) (99% - 100%)    Parameters below as of 05 Jul 2023 16:52  Patient On (Oxygen Delivery Method): room air      I&O's Summary      PHYSICAL EXAM:    General: NAD, well developed  HEENT: NC/AT; EOMI, PERRLA, anicteric sclera; moist mucosal membranes.  Neck: supple, trachea midline  Cardiovascular: RRR, +S1/S2; NO M/R/G  Respiratory: CTA B/L; no W/R/R  Gastrointestinal: soft, NT/ND; +BSx4  Extremities: WWP; no edema or cyanosis  Vascular: 2+ radial, DP/PT pulses B/L  Neurological: AAOx3; no focal deficits    ALLERGIES:  Allergies    amoxicillin (Rash)    Intolerances        MEDICATIONS:  MEDICATIONS  (STANDING):  ARIPiprazole 15 milliGRAM(s) Oral daily  gabapentin 300 milliGRAM(s) Oral three times a day  nicotine -  14 mG/24Hr(s) Patch 1 Patch Transdermal daily  NIFEdipine XL 30 milliGRAM(s) Oral two times a day  QUEtiapine 50 milliGRAM(s) Oral at bedtime  QUEtiapine 50 milliGRAM(s) Oral once    MEDICATIONS  (PRN):  acetaminophen     Tablet .. 650 milliGRAM(s) Oral every 6 hours PRN Temp greater or equal to 38C (100.4F), Mild Pain (1 - 3), Moderate Pain (4 - 6), Severe Pain (7 - 10)  aluminum hydroxide/magnesium hydroxide/simethicone Suspension 30 milliLiter(s) Oral every 6 hours PRN Dyspepsia  benztropine 1 milliGRAM(s) Oral daily PRN Antipsychotic induced akathisia  diphenhydrAMINE 50 milliGRAM(s) Oral at bedtime PRN Insomnia  haloperidol     Tablet 5 milliGRAM(s) Oral every 6 hours PRN agitation  LORazepam     Tablet 2 milliGRAM(s) Oral every 6 hours PRN agitation/hallucinations  magnesium hydroxide Suspension 30 milliLiter(s) Oral daily PRN Constipation  nicotine  Polacrilex Gum 2 milliGRAM(s) Oral every 2 hours PRN nrt      -------------------------------------------------------------------------------  LABS:                CAPILLARY BLOOD GLUCOSE              RADIOLOGY & ADDITIONAL TESTS: Reviewed.       ABEBE MONGE, 38y, Female  MRN-[id number]  Patient is a 38y old  Female who presents with a chief complaint of psych admission (01 Jul 2023 23:29)      OVERNIGHT EVENTS: NAEO     SUBJECTIVE: Pt seen/examined at bedside. Pt energetic and stating that her mind feels more "clear." Denies abdominal pain, n/v, cough, SOB.     12 Point ROS Negative unless noted otherwise above.  -------------------------------------------------------------------------------  VITAL SIGNS:  Vital Signs Last 24 Hrs  T(C): 36.7 (05 Jul 2023 16:52), Max: 36.7 (05 Jul 2023 16:52)  T(F): 98.1 (05 Jul 2023 16:52), Max: 98.1 (05 Jul 2023 16:52)  HR: 105 (05 Jul 2023 16:52) (105 - 108)  BP: 168/105 (05 Jul 2023 16:52) (162/116 - 179/107)  RR: 18 (05 Jul 2023 16:52) (18 - 18)  SpO2: 99% (05 Jul 2023 16:52) (99% - 100%)    Parameters below as of 05 Jul 2023 16:52  Patient On (Oxygen Delivery Method): room air      I&O's Summary      PHYSICAL EXAM:    General: NAD  HEENT: NC/AT; moist mucosal membranes.  Cardiovascular: RRR, +S1/S2; NO M/R/G  Respiratory: CTA B/L; no W/R/R  Gastrointestinal: soft, NT/ND; +BSx4  Extremities: WWP; no edema or cyanosis  Vascular: 2+ radial, DP/PT pulses B/L  Neurological: AAOx3; no focal deficits    ALLERGIES:  Allergies    amoxicillin (Rash)    Intolerances        MEDICATIONS:  MEDICATIONS  (STANDING):  ARIPiprazole 15 milliGRAM(s) Oral daily  gabapentin 300 milliGRAM(s) Oral three times a day  nicotine -  14 mG/24Hr(s) Patch 1 Patch Transdermal daily  NIFEdipine XL 30 milliGRAM(s) Oral two times a day  QUEtiapine 50 milliGRAM(s) Oral at bedtime  QUEtiapine 50 milliGRAM(s) Oral once    MEDICATIONS  (PRN):  acetaminophen     Tablet .. 650 milliGRAM(s) Oral every 6 hours PRN Temp greater or equal to 38C (100.4F), Mild Pain (1 - 3), Moderate Pain (4 - 6), Severe Pain (7 - 10)  aluminum hydroxide/magnesium hydroxide/simethicone Suspension 30 milliLiter(s) Oral every 6 hours PRN Dyspepsia  benztropine 1 milliGRAM(s) Oral daily PRN Antipsychotic induced akathisia  diphenhydrAMINE 50 milliGRAM(s) Oral at bedtime PRN Insomnia  haloperidol     Tablet 5 milliGRAM(s) Oral every 6 hours PRN agitation  LORazepam     Tablet 2 milliGRAM(s) Oral every 6 hours PRN agitation/hallucinations  magnesium hydroxide Suspension 30 milliLiter(s) Oral daily PRN Constipation  nicotine  Polacrilex Gum 2 milliGRAM(s) Oral every 2 hours PRN nrt      -------------------------------------------------------------------------------  LABS:                CAPILLARY BLOOD GLUCOSE              RADIOLOGY & ADDITIONAL TESTS: Reviewed.

## 2023-07-07 PROCEDURE — 99232 SBSQ HOSP IP/OBS MODERATE 35: CPT | Mod: GC

## 2023-07-07 PROCEDURE — 99233 SBSQ HOSP IP/OBS HIGH 50: CPT

## 2023-07-07 RX ADMIN — AMLODIPINE BESYLATE 10 MILLIGRAM(S): 2.5 TABLET ORAL at 10:08

## 2023-07-07 RX ADMIN — GABAPENTIN 300 MILLIGRAM(S): 400 CAPSULE ORAL at 21:47

## 2023-07-07 RX ADMIN — GABAPENTIN 300 MILLIGRAM(S): 400 CAPSULE ORAL at 14:02

## 2023-07-07 RX ADMIN — QUETIAPINE FUMARATE 50 MILLIGRAM(S): 200 TABLET, FILM COATED ORAL at 21:47

## 2023-07-07 RX ADMIN — GABAPENTIN 300 MILLIGRAM(S): 400 CAPSULE ORAL at 10:08

## 2023-07-07 RX ADMIN — ARIPIPRAZOLE 15 MILLIGRAM(S): 15 TABLET ORAL at 10:08

## 2023-07-07 NOTE — PROGRESS NOTE ADULT - ASSESSMENT
39 yo F, domiciled with family, unemployed, history of bipolar disorder, alcohol use (3 glasses of wine per night, last use 1-2 weeks ago), nicotine use disorder (jewel, 1 pod per day since 17 years old) who was brought in by family for manic behavior, currently admitted to inpatient psych. Med consulted for hypertension and tachycardia.     # HTN with tachycardia -- IMPROVING   - manic episode might be contributory, no sign of heart failure.   - Continue Amlodipine 10mg once daily  (Hold for SBP< 90 and/or HR <60)  - continue to monitor     # Bipolar d/o   # Manic episode  - active management per Psychiatry team   - ARIPiprazole 10 milliGRAM(s) Oral daily  - QUEtiapine 50 milliGRAM(s) Oral at bedtime  benztropine 1 milliGRAM(s) Oral daily PRN Antipsychotic induced akathisia  diphenhydrAMINE 50 milliGRAM(s) Oral at bedtime PRN Insomnia  haloperidol     Tablet 5 milliGRAM(s) Oral every 6 hours PRN agitation  LORazepam     Tablet 2 milliGRAM(s) Oral every 6 hours PRN anxiety    # Cig smoking  nicotine  Polacrilex Gum 2 milliGRAM(s) Oral every 2 hours PRN nrt    thank you for allowing medicine to participate in the care, milo BERMUDEZ RN .

## 2023-07-07 NOTE — BH INPATIENT PSYCHIATRY PROGRESS NOTE - OTHER
tearful at times

## 2023-07-07 NOTE — PROGRESS NOTE ADULT - SUBJECTIVE AND OBJECTIVE BOX
ABEBE MONGE, 38y, Female  MRN-[id number]  Patient is a 38y old  Female who presents with a chief complaint of psych admission (01 Jul 2023 23:29)      OVERNIGHT EVENTS: NAEO     SUBJECTIVE: Pt seen/examined at bedside. Pt stating she is doing well and trying to sleep through the night. Denies chest pain, cough, SOB. Pt not indicating any new symptoms/side effects. Wanting to go home soon.     12 Point ROS Negative unless noted otherwise above.  -------------------------------------------------------------------------------  VITAL SIGNS:  Vital Signs Last 24 Hrs  T(C): 36.6 (06 Jul 2023 17:26), Max: 36.6 (06 Jul 2023 17:26)  T(F): 97.9 (06 Jul 2023 17:26), Max: 97.9 (06 Jul 2023 17:26)  HR: 104 (06 Jul 2023 17:26) (104 - 104)  BP: 142/86 (06 Jul 2023 17:26) (142/86 - 142/86)  BP(mean): --  RR: 18 (06 Jul 2023 17:26) (18 - 18)  SpO2: 98% (06 Jul 2023 17:26) (98% - 98%)    Parameters below as of 06 Jul 2023 17:26  Patient On (Oxygen Delivery Method): room air      I&O's Summary      PHYSICAL EXAM:    General: NAD  HEENT: NC/AT; EOMI, PERRLA, anicteric sclera; moist mucosal membranes.  Neck: supple, trachea midline  Cardiovascular: RRR, +S1/S2; NO M/R/G  Respiratory: CTA B/L; no W/R/R  Gastrointestinal: soft, NT/ND; +BSx4  Extremities: WWP; no edema or cyanosis  Vascular: 2+ radial, DP/PT pulses B/L  Neurological: AAOx3; no focal deficits    ALLERGIES:  Allergies    amoxicillin (Rash)    Intolerances        MEDICATIONS:  MEDICATIONS  (STANDING):  amLODIPine   Tablet 10 milliGRAM(s) Oral daily  ARIPiprazole 15 milliGRAM(s) Oral daily  gabapentin 300 milliGRAM(s) Oral three times a day  nicotine -  14 mG/24Hr(s) Patch 1 Patch Transdermal daily  QUEtiapine 50 milliGRAM(s) Oral at bedtime  QUEtiapine 50 milliGRAM(s) Oral once    MEDICATIONS  (PRN):  acetaminophen     Tablet .. 650 milliGRAM(s) Oral every 6 hours PRN Temp greater or equal to 38C (100.4F), Mild Pain (1 - 3), Moderate Pain (4 - 6), Severe Pain (7 - 10)  aluminum hydroxide/magnesium hydroxide/simethicone Suspension 30 milliLiter(s) Oral every 6 hours PRN Dyspepsia  benztropine 1 milliGRAM(s) Oral daily PRN Antipsychotic induced akathisia  diphenhydrAMINE 50 milliGRAM(s) Oral at bedtime PRN Insomnia  haloperidol     Tablet 5 milliGRAM(s) Oral every 6 hours PRN agitation  LORazepam     Tablet 2 milliGRAM(s) Oral every 6 hours PRN agitation/hallucinations  magnesium hydroxide Suspension 30 milliLiter(s) Oral daily PRN Constipation  nicotine  Polacrilex Gum 2 milliGRAM(s) Oral every 2 hours PRN nrt  senna 1 Tablet(s) Oral at bedtime PRN constipation      -------------------------------------------------------------------------------  LABS:                CAPILLARY BLOOD GLUCOSE              RADIOLOGY & ADDITIONAL TESTS: Reviewed.

## 2023-07-08 PROCEDURE — 99232 SBSQ HOSP IP/OBS MODERATE 35: CPT | Mod: GC

## 2023-07-08 RX ADMIN — GABAPENTIN 300 MILLIGRAM(S): 400 CAPSULE ORAL at 13:13

## 2023-07-08 RX ADMIN — GABAPENTIN 300 MILLIGRAM(S): 400 CAPSULE ORAL at 21:52

## 2023-07-08 RX ADMIN — GABAPENTIN 300 MILLIGRAM(S): 400 CAPSULE ORAL at 10:29

## 2023-07-08 RX ADMIN — QUETIAPINE FUMARATE 50 MILLIGRAM(S): 200 TABLET, FILM COATED ORAL at 21:52

## 2023-07-08 RX ADMIN — ARIPIPRAZOLE 15 MILLIGRAM(S): 15 TABLET ORAL at 10:30

## 2023-07-08 RX ADMIN — AMLODIPINE BESYLATE 10 MILLIGRAM(S): 2.5 TABLET ORAL at 10:29

## 2023-07-08 NOTE — PROGRESS NOTE ADULT - SUBJECTIVE AND OBJECTIVE BOX
ABEBE MONGE, 38y, Female  MRN-[id number]  Patient is a 38y old  Female who presents with a chief complaint of psych admission (01 Jul 2023 23:29)      OVERNIGHT EVENTS: NAEO     SUBJECTIVE:    12 Point ROS Negative unless noted otherwise above.  -------------------------------------------------------------------------------  VITAL SIGNS:  Vital Signs Last 24 Hrs  T(C): 36.8 (08 Jul 2023 06:23), Max: 36.8 (08 Jul 2023 06:23)  T(F): 98.3 (08 Jul 2023 06:23), Max: 98.3 (08 Jul 2023 06:23)  HR: 107 (08 Jul 2023 06:23) (105 - 107)  BP: 170/99 (08 Jul 2023 06:23) (154/93 - 170/99)  BP(mean): --  RR: 18 (08 Jul 2023 06:23) (18 - 18)  SpO2: 100% (08 Jul 2023 06:23) (99% - 100%)    Parameters below as of 08 Jul 2023 06:23  Patient On (Oxygen Delivery Method): room air      I&O's Summary      PHYSICAL EXAM:    General: NAD, well developed  HEENT: NC/AT; EOMI, PERRLA, anicteric sclera; moist mucosal membranes.  Neck: supple, trachea midline  Cardiovascular: RRR, +S1/S2; NO M/R/G  Respiratory: CTA B/L; no W/R/R  Gastrointestinal: soft, NT/ND; +BSx4  Extremities: WWP; no edema or cyanosis  Vascular: 2+ radial, DP/PT pulses B/L  Neurological: AAOx3; no focal deficits    ALLERGIES:  Allergies    amoxicillin (Rash)    Intolerances        MEDICATIONS:  MEDICATIONS  (STANDING):  amLODIPine   Tablet 10 milliGRAM(s) Oral daily  ARIPiprazole 15 milliGRAM(s) Oral daily  gabapentin 300 milliGRAM(s) Oral three times a day  nicotine -  14 mG/24Hr(s) Patch 1 Patch Transdermal daily  QUEtiapine 50 milliGRAM(s) Oral at bedtime  QUEtiapine 50 milliGRAM(s) Oral once    MEDICATIONS  (PRN):  acetaminophen     Tablet .. 650 milliGRAM(s) Oral every 6 hours PRN Temp greater or equal to 38C (100.4F), Mild Pain (1 - 3), Moderate Pain (4 - 6), Severe Pain (7 - 10)  aluminum hydroxide/magnesium hydroxide/simethicone Suspension 30 milliLiter(s) Oral every 6 hours PRN Dyspepsia  benztropine 1 milliGRAM(s) Oral daily PRN Antipsychotic induced akathisia  diphenhydrAMINE 50 milliGRAM(s) Oral at bedtime PRN Insomnia  haloperidol     Tablet 5 milliGRAM(s) Oral every 6 hours PRN agitation  LORazepam     Tablet 2 milliGRAM(s) Oral every 6 hours PRN agitation/hallucinations  magnesium hydroxide Suspension 30 milliLiter(s) Oral daily PRN Constipation  nicotine  Polacrilex Gum 2 milliGRAM(s) Oral every 2 hours PRN nrt  senna 1 Tablet(s) Oral at bedtime PRN constipation      -------------------------------------------------------------------------------  LABS:                CAPILLARY BLOOD GLUCOSE              RADIOLOGY & ADDITIONAL TESTS: Reviewed.       ABEBE MONGE, 38y, Female  MRN-[id number]  Patient is a 38y old  Female who presents with a chief complaint of psych admission (01 Jul 2023 23:29)      OVERNIGHT EVENTS: NAEO     SUBJECTIVE: Pt seen/examined at bedside. Endorsing she awakens a couple of times at night to use the bathroom which is why she did not sleep well last night. Otherwise has no other complaints and feels well.     12 Point ROS Negative unless noted otherwise above.  -------------------------------------------------------------------------------  VITAL SIGNS:  Vital Signs Last 24 Hrs  T(C): 36.8 (08 Jul 2023 06:23), Max: 36.8 (08 Jul 2023 06:23)  T(F): 98.3 (08 Jul 2023 06:23), Max: 98.3 (08 Jul 2023 06:23)  HR: 107 (08 Jul 2023 06:23) (105 - 107)  BP: 170/99 (08 Jul 2023 06:23) (154/93 - 170/99)  BP(mean): --  RR: 18 (08 Jul 2023 06:23) (18 - 18)  SpO2: 100% (08 Jul 2023 06:23) (99% - 100%)    Parameters below as of 08 Jul 2023 06:23  Patient On (Oxygen Delivery Method): room air      I&O's Summary      PHYSICAL EXAM:    General: NAD; cheerful   HEENT: NC/AT;  moist mucosal membranes.  Cardiovascular: RRR, +S1/S2; NO M/R/G  Respiratory: CTA B/L; no W/R/R  Gastrointestinal: soft, NT/ND; +BSx4  Extremities: WWP; no edema or cyanosis  Vascular: 2+ radial, DP/PT pulses B/L  Neurological: AAOx3; no focal deficits    ALLERGIES:  Allergies    amoxicillin (Rash)    Intolerances        MEDICATIONS:  MEDICATIONS  (STANDING):  amLODIPine   Tablet 10 milliGRAM(s) Oral daily  ARIPiprazole 15 milliGRAM(s) Oral daily  gabapentin 300 milliGRAM(s) Oral three times a day  nicotine -  14 mG/24Hr(s) Patch 1 Patch Transdermal daily  QUEtiapine 50 milliGRAM(s) Oral at bedtime  QUEtiapine 50 milliGRAM(s) Oral once    MEDICATIONS  (PRN):  acetaminophen     Tablet .. 650 milliGRAM(s) Oral every 6 hours PRN Temp greater or equal to 38C (100.4F), Mild Pain (1 - 3), Moderate Pain (4 - 6), Severe Pain (7 - 10)  aluminum hydroxide/magnesium hydroxide/simethicone Suspension 30 milliLiter(s) Oral every 6 hours PRN Dyspepsia  benztropine 1 milliGRAM(s) Oral daily PRN Antipsychotic induced akathisia  diphenhydrAMINE 50 milliGRAM(s) Oral at bedtime PRN Insomnia  haloperidol     Tablet 5 milliGRAM(s) Oral every 6 hours PRN agitation  LORazepam     Tablet 2 milliGRAM(s) Oral every 6 hours PRN agitation/hallucinations  magnesium hydroxide Suspension 30 milliLiter(s) Oral daily PRN Constipation  nicotine  Polacrilex Gum 2 milliGRAM(s) Oral every 2 hours PRN nrt  senna 1 Tablet(s) Oral at bedtime PRN constipation      -------------------------------------------------------------------------------  LABS:                CAPILLARY BLOOD GLUCOSE              RADIOLOGY & ADDITIONAL TESTS: Reviewed.

## 2023-07-08 NOTE — BH SAFETY PLAN - WARNING SIGN 1
Pt. completed a written safety plan and was given a copy to take with her. Additional copies can be found in pt.'s chart.

## 2023-07-08 NOTE — PROGRESS NOTE ADULT - ASSESSMENT
37 yo F, domiciled with family, unemployed, history of bipolar disorder, alcohol use (3 glasses of wine per night, last use 1-2 weeks ago), nicotine use disorder (jewel, 1 pod per day since 17 years old) who was brought in by family for manic behavior, currently admitted to inpatient psych. Med consulted for hypertension and tachycardia.     # HTN with tachycardia -- IMPROVING   - manic episode might be contributory, no sign of heart failure.   - Continue Amlodipine 10mg once daily  (Hold for SBP< 90 and/or HR <60)  - continue to monitor     # Bipolar d/o   # Manic episode  - active management per Psychiatry team   - ARIPiprazole 10 milliGRAM(s) Oral daily  - QUEtiapine 50 milliGRAM(s) Oral at bedtime  benztropine 1 milliGRAM(s) Oral daily PRN Antipsychotic induced akathisia  diphenhydrAMINE 50 milliGRAM(s) Oral at bedtime PRN Insomnia  haloperidol     Tablet 5 milliGRAM(s) Oral every 6 hours PRN agitation  LORazepam     Tablet 2 milliGRAM(s) Oral every 6 hours PRN anxiety    # Cig smoking  nicotine  Polacrilex Gum 2 milliGRAM(s) Oral every 2 hours PRN nrt    Medicine to continue to follows  Recs final pending attending attestation    37 yo F, domiciled with family, unemployed, history of bipolar disorder, alcohol use (3 glasses of wine per night, last use 1-2 weeks ago), nicotine use disorder (jewel, 1 pod per day since 17 years old) who was brought in by family for manic behavior, currently admitted to inpatient psych. Med consulted for hypertension and tachycardia.     # HTN with tachycardia -- IMPROVING   - mild HTN noted this AM with SBP in 160s however patient with not enough sleep last night and had not taken AM medications. Encouraged to avoid drinking liquids prior to bedtime to reduce nighttime awakenings.   - Continue Amlodipine 10mg once daily  (Hold for SBP< 90 and/or HR <60)    # Bipolar d/o   # Manic episode  - active management per Psychiatry team   - ARIPiprazole 10 milliGRAM(s) Oral daily  - QUEtiapine 50 milliGRAM(s) Oral at bedtime  benztropine 1 milliGRAM(s) Oral daily PRN Antipsychotic induced akathisia  diphenhydrAMINE 50 milliGRAM(s) Oral at bedtime PRN Insomnia  haloperidol     Tablet 5 milliGRAM(s) Oral every 6 hours PRN agitation  LORazepam     Tablet 2 milliGRAM(s) Oral every 6 hours PRN anxiety    # Cig smoking  nicotine  Polacrilex Gum 2 milliGRAM(s) Oral every 2 hours PRN nrt    Medicine to continue to follows  Recs final pending attending attestation

## 2023-07-09 PROCEDURE — 99232 SBSQ HOSP IP/OBS MODERATE 35: CPT

## 2023-07-09 RX ADMIN — AMLODIPINE BESYLATE 10 MILLIGRAM(S): 2.5 TABLET ORAL at 10:40

## 2023-07-09 RX ADMIN — ARIPIPRAZOLE 15 MILLIGRAM(S): 15 TABLET ORAL at 10:59

## 2023-07-09 RX ADMIN — GABAPENTIN 300 MILLIGRAM(S): 400 CAPSULE ORAL at 10:40

## 2023-07-09 RX ADMIN — QUETIAPINE FUMARATE 50 MILLIGRAM(S): 200 TABLET, FILM COATED ORAL at 21:22

## 2023-07-09 RX ADMIN — GABAPENTIN 300 MILLIGRAM(S): 400 CAPSULE ORAL at 14:33

## 2023-07-09 RX ADMIN — GABAPENTIN 300 MILLIGRAM(S): 400 CAPSULE ORAL at 21:22

## 2023-07-09 NOTE — PROGRESS NOTE ADULT - ASSESSMENT
39 yo F, domiciled with family, unemployed, history of bipolar disorder, alcohol use (3 glasses of wine per night, last use 1-2 weeks ago), nicotine use disorder (jewel, 1 pod per day since 17 years old) who was brought in by family for manic behavior, currently admitted to inpatient psych. Med consulted for hypertension and tachycardia.     # HTN with tachycardia   - //100 on [id number] daily, continue monitor BP, consider adding HCTZ 12.5mg po daily or changing Amlodipine back to Nifedipine XL at 60mg po daily  - continue Amlodipine 10mg po daily for now (7/6-, swtiched from Nifedipine XL 30mg po bid)    # Bipolar d/o   # Manic episode  - active management per Psychiatry team   - ARIPiprazole 10 milliGRAM(s) Oral daily  - QUEtiapine 50 milliGRAM(s) Oral at bedtime  benztropine 1 milliGRAM(s) Oral daily PRN Antipsychotic induced akathisia  diphenhydrAMINE 50 milliGRAM(s) Oral at bedtime PRN Insomnia  haloperidol     Tablet 5 milliGRAM(s) Oral every 6 hours PRN agitation  LORazepam     Tablet 2 milliGRAM(s) Oral every 6 hours PRN anxiety    # Cig smoking  nicotine  Polacrilex Gum 2 milliGRAM(s) Oral every 2 hours PRN nrt    thank you for allowing medicine to participate in the care, milo BERMUDEZ .

## 2023-07-09 NOTE — PROGRESS NOTE ADULT - SUBJECTIVE AND OBJECTIVE BOX
Patient is a 38y old  Female who presents with a chief complaint of psych admission (01 Jul 2023 23:29)    INTERVAL EVENTS: NAEON    SUBJECTIVE:  Patient was seen and examined at bedside. Pt reports cutting back the water intake in evening helps improving her symptoms in getting up at night for the bathroom    Review of systems: No fever, chills, dizziness, HA, Changes in vision, CP, dyspnea, nausea or vomiting, dysuria, changes in bowel movements, LE edema. Rest of 12 point Review of systems negative unless otherwise documented elsewhere in note.     Diet, Regular (06-27-23 @ 18:16) [Active]      MEDICATIONS:  MEDICATIONS  (STANDING):  amLODIPine   Tablet 10 milliGRAM(s) Oral daily  ARIPiprazole 15 milliGRAM(s) Oral daily  gabapentin 300 milliGRAM(s) Oral three times a day  nicotine -  14 mG/24Hr(s) Patch 1 Patch Transdermal daily  QUEtiapine 50 milliGRAM(s) Oral at bedtime  QUEtiapine 50 milliGRAM(s) Oral once    MEDICATIONS  (PRN):  acetaminophen     Tablet .. 650 milliGRAM(s) Oral every 6 hours PRN Temp greater or equal to 38C (100.4F), Mild Pain (1 - 3), Moderate Pain (4 - 6), Severe Pain (7 - 10)  aluminum hydroxide/magnesium hydroxide/simethicone Suspension 30 milliLiter(s) Oral every 6 hours PRN Dyspepsia  benztropine 1 milliGRAM(s) Oral daily PRN Antipsychotic induced akathisia  diphenhydrAMINE 50 milliGRAM(s) Oral at bedtime PRN Insomnia  haloperidol     Tablet 5 milliGRAM(s) Oral every 6 hours PRN agitation  LORazepam     Tablet 2 milliGRAM(s) Oral every 6 hours PRN agitation/hallucinations  magnesium hydroxide Suspension 30 milliLiter(s) Oral daily PRN Constipation  nicotine  Polacrilex Gum 2 milliGRAM(s) Oral every 2 hours PRN nrt  senna 1 Tablet(s) Oral at bedtime PRN constipation      Allergies    amoxicillin (Rash)    Intolerances        OBJECTIVE:  Vital Signs Last 24 Hrs  T(C): 36.6 (09 Jul 2023 10:51), Max: 36.8 (08 Jul 2023 16:35)  T(F): 97.9 (09 Jul 2023 10:51), Max: 98.3 (08 Jul 2023 16:35)  HR: 90 (09 Jul 2023 10:51) (90 - 102)  BP: 135/89 (09 Jul 2023 10:51) (135/89 - 160/100)  BP(mean): --  RR: 18 (09 Jul 2023 10:51) (18 - 18)  SpO2: 99% (09 Jul 2023 10:51) (99% - 100%)    Parameters below as of 09 Jul 2023 10:51  Patient On (Oxygen Delivery Method): room air      I&O's Summary      PHYSICAL EXAM:  Gen: Reclining in bed at time of exam, appears stated age  HEENT: NCAT, MMM, clear OP  Neck: supple, trachea at midline  CV: RRR, +S1/S2  Pulm: adequate respiratory effort, no increase in work of breathing  Abd: soft, NTND  Skin: warm and dry,   Ext: WWP, no LE edema  Neuro: AOx3, no gross focal neurological deficits  Psych: affect and behavior appropriate, pleasant at time of interview  :     LABS:              CAPILLARY BLOOD GLUCOSE            MICRODATA:      RADIOLOGY/OTHER STUDIES:    PCP  Pharmacy:   Emergency contact:

## 2023-07-10 PROCEDURE — 99232 SBSQ HOSP IP/OBS MODERATE 35: CPT | Mod: GC

## 2023-07-10 PROCEDURE — 99233 SBSQ HOSP IP/OBS HIGH 50: CPT

## 2023-07-10 RX ORDER — NALTREXONE HYDROCHLORIDE 50 MG/1
50 TABLET, FILM COATED ORAL ONCE
Refills: 0 | Status: COMPLETED | OUTPATIENT
Start: 2023-07-10 | End: 2023-07-10

## 2023-07-10 RX ORDER — ARIPIPRAZOLE 15 MG/1
20 TABLET ORAL DAILY
Refills: 0 | Status: DISCONTINUED | OUTPATIENT
Start: 2023-07-10 | End: 2023-07-13

## 2023-07-10 RX ADMIN — GABAPENTIN 300 MILLIGRAM(S): 400 CAPSULE ORAL at 21:51

## 2023-07-10 RX ADMIN — NALTREXONE HYDROCHLORIDE 50 MILLIGRAM(S): 50 TABLET, FILM COATED ORAL at 21:56

## 2023-07-10 RX ADMIN — GABAPENTIN 300 MILLIGRAM(S): 400 CAPSULE ORAL at 10:45

## 2023-07-10 RX ADMIN — ARIPIPRAZOLE 15 MILLIGRAM(S): 15 TABLET ORAL at 10:45

## 2023-07-10 RX ADMIN — QUETIAPINE FUMARATE 50 MILLIGRAM(S): 200 TABLET, FILM COATED ORAL at 21:51

## 2023-07-10 RX ADMIN — GABAPENTIN 300 MILLIGRAM(S): 400 CAPSULE ORAL at 15:51

## 2023-07-10 RX ADMIN — AMLODIPINE BESYLATE 10 MILLIGRAM(S): 2.5 TABLET ORAL at 10:45

## 2023-07-10 NOTE — PROGRESS NOTE ADULT - SUBJECTIVE AND OBJECTIVE BOX
ABEBE MONGE, 38y, Female  MRN-[id number]  Patient is a 38y old  Female who presents with a chief complaint of psych admission (01 Jul 2023 23:29)      OVERNIGHT EVENTS: NAEO     SUBJECTIVE:    12 Point ROS Negative unless noted otherwise above.  -------------------------------------------------------------------------------  VITAL SIGNS:  Vital Signs Last 24 Hrs  T(C): 36.8 (09 Jul 2023 16:49), Max: 36.8 (09 Jul 2023 16:49)  T(F): 98.3 (09 Jul 2023 16:49), Max: 98.3 (09 Jul 2023 16:49)  HR: 102 (09 Jul 2023 16:49) (90 - 102)  BP: 157/93 (09 Jul 2023 16:49) (135/89 - 157/93)  BP(mean): --  RR: 18 (09 Jul 2023 16:49) (18 - 18)  SpO2: 98% (09 Jul 2023 16:49) (98% - 99%)    Parameters below as of 09 Jul 2023 16:49  Patient On (Oxygen Delivery Method): room air      I&O's Summary      PHYSICAL EXAM:    General: NAD, well developed  HEENT: NC/AT; EOMI, PERRLA, anicteric sclera; moist mucosal membranes.  Neck: supple, trachea midline  Cardiovascular: RRR, +S1/S2; NO M/R/G  Respiratory: CTA B/L; no W/R/R  Gastrointestinal: soft, NT/ND; +BSx4  Extremities: WWP; no edema or cyanosis  Vascular: 2+ radial, DP/PT pulses B/L  Neurological: AAOx3; no focal deficits    ALLERGIES:  Allergies    amoxicillin (Rash)    Intolerances        MEDICATIONS:  MEDICATIONS  (STANDING):  amLODIPine   Tablet 10 milliGRAM(s) Oral daily  ARIPiprazole 15 milliGRAM(s) Oral daily  gabapentin 300 milliGRAM(s) Oral three times a day  nicotine -  14 mG/24Hr(s) Patch 1 Patch Transdermal daily  QUEtiapine 50 milliGRAM(s) Oral at bedtime  QUEtiapine 50 milliGRAM(s) Oral once    MEDICATIONS  (PRN):  acetaminophen     Tablet .. 650 milliGRAM(s) Oral every 6 hours PRN Temp greater or equal to 38C (100.4F), Mild Pain (1 - 3), Moderate Pain (4 - 6), Severe Pain (7 - 10)  aluminum hydroxide/magnesium hydroxide/simethicone Suspension 30 milliLiter(s) Oral every 6 hours PRN Dyspepsia  benztropine 1 milliGRAM(s) Oral daily PRN Antipsychotic induced akathisia  diphenhydrAMINE 50 milliGRAM(s) Oral at bedtime PRN Insomnia  haloperidol     Tablet 5 milliGRAM(s) Oral every 6 hours PRN agitation  LORazepam     Tablet 2 milliGRAM(s) Oral every 6 hours PRN agitation/hallucinations  magnesium hydroxide Suspension 30 milliLiter(s) Oral daily PRN Constipation  nicotine  Polacrilex Gum 2 milliGRAM(s) Oral every 2 hours PRN nrt  senna 1 Tablet(s) Oral at bedtime PRN constipation      -------------------------------------------------------------------------------  LABS:                CAPILLARY BLOOD GLUCOSE              RADIOLOGY & ADDITIONAL TESTS: Reviewed.       ABEBE MONGE, 38y, Female  MRN-[id number]  Patient is a 38y old  Female who presents with a chief complaint of psych admission (01 Jul 2023 23:29)      OVERNIGHT EVENTS: NAEO     SUBJECTIVE: Pt seen/examined at bedside. Pt stating she is eager to get home and is concerned about her discharge planning. Pt stating that she is doing well and as she sleeps better she notices her BPs to be better. Denies other ROS.     12 Point ROS Negative unless noted otherwise above.  -------------------------------------------------------------------------------  VITAL SIGNS:  Vital Signs Last 24 Hrs  T(C): 36.8 (09 Jul 2023 16:49), Max: 36.8 (09 Jul 2023 16:49)  T(F): 98.3 (09 Jul 2023 16:49), Max: 98.3 (09 Jul 2023 16:49)  HR: 102 (09 Jul 2023 16:49) (90 - 102)  BP: 157/93 (09 Jul 2023 16:49) (135/89 - 157/93)  BP(mean): --  RR: 18 (09 Jul 2023 16:49) (18 - 18)  SpO2: 98% (09 Jul 2023 16:49) (98% - 99%)    Parameters below as of 09 Jul 2023 16:49  Patient On (Oxygen Delivery Method): room air      I&O's Summary      PHYSICAL EXAM:    General: NAD  HEENT: NC/AT;  moist mucosal membranes.  Cardiovascular: RRR, +S1/S2; NO M/R/G  Respiratory: CTA B/L; no W/R/R  Gastrointestinal: soft, NT/ND; +BSx4  Extremities: WWP; no edema or cyanosis  Vascular: 2+ radial, DP/PT pulses B/L  Neurological: AAOx3; no focal deficits    ALLERGIES:  Allergies    amoxicillin (Rash)    Intolerances        MEDICATIONS:  MEDICATIONS  (STANDING):  amLODIPine   Tablet 10 milliGRAM(s) Oral daily  ARIPiprazole 15 milliGRAM(s) Oral daily  gabapentin 300 milliGRAM(s) Oral three times a day  nicotine -  14 mG/24Hr(s) Patch 1 Patch Transdermal daily  QUEtiapine 50 milliGRAM(s) Oral at bedtime  QUEtiapine 50 milliGRAM(s) Oral once    MEDICATIONS  (PRN):  acetaminophen     Tablet .. 650 milliGRAM(s) Oral every 6 hours PRN Temp greater or equal to 38C (100.4F), Mild Pain (1 - 3), Moderate Pain (4 - 6), Severe Pain (7 - 10)  aluminum hydroxide/magnesium hydroxide/simethicone Suspension 30 milliLiter(s) Oral every 6 hours PRN Dyspepsia  benztropine 1 milliGRAM(s) Oral daily PRN Antipsychotic induced akathisia  diphenhydrAMINE 50 milliGRAM(s) Oral at bedtime PRN Insomnia  haloperidol     Tablet 5 milliGRAM(s) Oral every 6 hours PRN agitation  LORazepam     Tablet 2 milliGRAM(s) Oral every 6 hours PRN agitation/hallucinations  magnesium hydroxide Suspension 30 milliLiter(s) Oral daily PRN Constipation  nicotine  Polacrilex Gum 2 milliGRAM(s) Oral every 2 hours PRN nrt  senna 1 Tablet(s) Oral at bedtime PRN constipation      -------------------------------------------------------------------------------  LABS:                CAPILLARY BLOOD GLUCOSE              RADIOLOGY & ADDITIONAL TESTS: Reviewed.

## 2023-07-10 NOTE — BH INPATIENT PSYCHIATRY PROGRESS NOTE - NSBHMSESPABN_PSY_A_CORE
Pressured rate/Increased productivity
Increased productivity
Pressured rate/Increased productivity
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
## 2023-07-10 NOTE — PROGRESS NOTE ADULT - ASSESSMENT
37 yo F, domiciled with family, unemployed, history of bipolar disorder, alcohol use (3 glasses of wine per night, last use 1-2 weeks ago), nicotine use disorder (jewel, 1 pod per day since 17 years old) who was brought in by family for manic behavior, currently admitted to inpatient psych. Med consulted for hypertension and tachycardia.     # HTN with tachycardia -- IMPROVING   - //100 on [id number] daily, continue monitor BP, consider adding HCTZ 12.5mg po daily or changing Amlodipine back to Nifedipine XL at 60mg po daily  - continue Amlodipine 10mg po daily for now (7/6-, swtiched from Nifedipine XL 30mg po bid)    # Bipolar d/o   # Manic episode  - active management per Psychiatry team   - ARIPiprazole 10 milliGRAM(s) Oral daily  - QUEtiapine 50 milliGRAM(s) Oral at bedtime  benztropine 1 milliGRAM(s) Oral daily PRN Antipsychotic induced akathisia  diphenhydrAMINE 50 milliGRAM(s) Oral at bedtime PRN Insomnia  haloperidol     Tablet 5 milliGRAM(s) Oral every 6 hours PRN agitation  LORazepam     Tablet 2 milliGRAM(s) Oral every 6 hours PRN anxiety    # Cig smoking  nicotine  Polacrilex Gum 2 milliGRAM(s) Oral every 2 hours PRN nrt    thank you for allowing medicine to participate in the care, milo BERMUDEZ .

## 2023-07-11 PROCEDURE — 99232 SBSQ HOSP IP/OBS MODERATE 35: CPT

## 2023-07-11 PROCEDURE — 99233 SBSQ HOSP IP/OBS HIGH 50: CPT

## 2023-07-11 RX ORDER — NALTREXONE HYDROCHLORIDE 50 MG/1
380 TABLET, FILM COATED ORAL ONCE
Refills: 0 | Status: COMPLETED | OUTPATIENT
Start: 2023-07-12 | End: 2023-07-12

## 2023-07-11 RX ORDER — NALTREXONE HYDROCHLORIDE 50 MG/1
50 TABLET, FILM COATED ORAL ONCE
Refills: 0 | Status: COMPLETED | OUTPATIENT
Start: 2023-07-11 | End: 2023-07-11

## 2023-07-11 RX ADMIN — AMLODIPINE BESYLATE 10 MILLIGRAM(S): 2.5 TABLET ORAL at 10:35

## 2023-07-11 RX ADMIN — GABAPENTIN 300 MILLIGRAM(S): 400 CAPSULE ORAL at 10:35

## 2023-07-11 RX ADMIN — GABAPENTIN 300 MILLIGRAM(S): 400 CAPSULE ORAL at 21:29

## 2023-07-11 RX ADMIN — QUETIAPINE FUMARATE 50 MILLIGRAM(S): 200 TABLET, FILM COATED ORAL at 21:29

## 2023-07-11 RX ADMIN — GABAPENTIN 300 MILLIGRAM(S): 400 CAPSULE ORAL at 13:19

## 2023-07-11 RX ADMIN — ARIPIPRAZOLE 20 MILLIGRAM(S): 15 TABLET ORAL at 10:35

## 2023-07-11 RX ADMIN — NALTREXONE HYDROCHLORIDE 50 MILLIGRAM(S): 50 TABLET, FILM COATED ORAL at 18:17

## 2023-07-11 NOTE — PROGRESS NOTE ADULT - SUBJECTIVE AND OBJECTIVE BOX
SUBJECTIVE / INTERVAL HPI: Patient seen and examined at bedside. Patient states she feels much better at this time - she feels like her mood is more stable at this time.     VITAL SIGNS:  Vital Signs Last 24 Hrs  T(C): --  T(F): --  HR: --  BP: --  BP(mean): --  RR: --  SpO2: --        PHYSICAL EXAM:    General: WDWN  HEENT: NC/AT; PERRL, anicteric sclera; MMM  Neck: supple  Cardiovascular: +S1/S2; RRR  Respiratory: CTA B/L; no W/R/R  Gastrointestinal: soft, NT/ND; +BSx4  Extremities: WWP; no edema, clubbing or cyanosis  Vascular: 2+ radial, DP/PT pulses B/L  Neurological: AAOx3; no focal deficits    MEDICATIONS:  MEDICATIONS  (STANDING):  amLODIPine   Tablet 10 milliGRAM(s) Oral daily  ARIPiprazole 20 milliGRAM(s) Oral daily  gabapentin 300 milliGRAM(s) Oral three times a day  nicotine -  14 mG/24Hr(s) Patch 1 Patch Transdermal daily  QUEtiapine 50 milliGRAM(s) Oral at bedtime  QUEtiapine 50 milliGRAM(s) Oral once    MEDICATIONS  (PRN):  acetaminophen     Tablet .. 650 milliGRAM(s) Oral every 6 hours PRN Temp greater or equal to 38C (100.4F), Mild Pain (1 - 3), Moderate Pain (4 - 6), Severe Pain (7 - 10)  aluminum hydroxide/magnesium hydroxide/simethicone Suspension 30 milliLiter(s) Oral every 6 hours PRN Dyspepsia  benztropine 1 milliGRAM(s) Oral daily PRN Antipsychotic induced akathisia  diphenhydrAMINE 50 milliGRAM(s) Oral at bedtime PRN Insomnia  haloperidol     Tablet 5 milliGRAM(s) Oral every 6 hours PRN agitation  LORazepam     Tablet 2 milliGRAM(s) Oral every 6 hours PRN agitation/hallucinations  magnesium hydroxide Suspension 30 milliLiter(s) Oral daily PRN Constipation  nicotine  Polacrilex Gum 2 milliGRAM(s) Oral every 2 hours PRN nrt  senna 1 Tablet(s) Oral at bedtime PRN constipation      ALLERGIES:  Allergies    amoxicillin (Rash)    Intolerances

## 2023-07-11 NOTE — PROGRESS NOTE ADULT - ASSESSMENT
37 yo F, domiciled with family, unemployed, history of bipolar disorder, alcohol use (3 glasses of wine per night, last use 1-2 weeks ago), nicotine use disorder (jewel, 1 pod per day since 17 years old) who was brought in by family for manic behavior, currently admitted to inpatient psych. Med consulted for hypertension and tachycardia.     # HTN with tachycardia -- IMPROVING   - BP improving on [id number] daily, continue monitor BP, consider adding HCTZ 12.5mg po daily or changing Amlodipine back to Nifedipine XL at 60mg po daily  - continue Amlodipine 10mg po daily for now     # Bipolar d/o   # Manic episode  - active management per Psychiatry team   - ARIPiprazole 10 milliGRAM(s) Oral daily  - QUEtiapine 50 milliGRAM(s) Oral at bedtime  benztropine 1 milliGRAM(s) Oral daily PRN Antipsychotic induced akathisia  diphenhydrAMINE 50 milliGRAM(s) Oral at bedtime PRN Insomnia  haloperidol     Tablet 5 milliGRAM(s) Oral every 6 hours PRN agitation  LORazepam     Tablet 2 milliGRAM(s) Oral every 6 hours PRN anxiety    # Cig smoking  nicotine  Polacrilex Gum 2 milliGRAM(s) Oral every 2 hours PRN nrt    thank you for allowing medicine to participate in the care

## 2023-07-12 PROCEDURE — 99232 SBSQ HOSP IP/OBS MODERATE 35: CPT

## 2023-07-12 RX ORDER — GABAPENTIN 400 MG/1
1 CAPSULE ORAL
Qty: 0 | Refills: 0 | DISCHARGE
Start: 2023-07-12

## 2023-07-12 RX ORDER — AMLODIPINE BESYLATE 2.5 MG/1
1 TABLET ORAL
Qty: 30 | Refills: 0
Start: 2023-07-12 | End: 2023-08-10

## 2023-07-12 RX ORDER — QUETIAPINE FUMARATE 200 MG/1
1 TABLET, FILM COATED ORAL
Qty: 0 | Refills: 0 | DISCHARGE
Start: 2023-07-12

## 2023-07-12 RX ORDER — QUETIAPINE FUMARATE 200 MG/1
1 TABLET, FILM COATED ORAL
Qty: 30 | Refills: 0
Start: 2023-07-12 | End: 2023-08-10

## 2023-07-12 RX ORDER — NICOTINE POLACRILEX 2 MG
1 GUM BUCCAL
Qty: 360 | Refills: 0
Start: 2023-07-12 | End: 2023-08-10

## 2023-07-12 RX ORDER — AMLODIPINE BESYLATE 2.5 MG/1
1 TABLET ORAL
Qty: 0 | Refills: 0 | DISCHARGE
Start: 2023-07-12

## 2023-07-12 RX ORDER — ARIPIPRAZOLE 15 MG/1
300 TABLET ORAL ONCE
Refills: 0 | Status: DISCONTINUED | OUTPATIENT
Start: 2023-07-13 | End: 2023-07-13

## 2023-07-12 RX ORDER — ARIPIPRAZOLE 15 MG/1
1 TABLET ORAL
Qty: 0 | Refills: 0 | DISCHARGE
Start: 2023-07-12

## 2023-07-12 RX ORDER — ARIPIPRAZOLE 15 MG/1
1 TABLET ORAL
Qty: 14 | Refills: 0
Start: 2023-07-12 | End: 2023-07-25

## 2023-07-12 RX ORDER — GABAPENTIN 400 MG/1
1 CAPSULE ORAL
Qty: 90 | Refills: 0
Start: 2023-07-12 | End: 2023-08-10

## 2023-07-12 RX ADMIN — ARIPIPRAZOLE 20 MILLIGRAM(S): 15 TABLET ORAL at 10:17

## 2023-07-12 RX ADMIN — GABAPENTIN 300 MILLIGRAM(S): 400 CAPSULE ORAL at 10:18

## 2023-07-12 RX ADMIN — NALTREXONE HYDROCHLORIDE 380 MILLIGRAM(S): 50 TABLET, FILM COATED ORAL at 17:47

## 2023-07-12 RX ADMIN — GABAPENTIN 300 MILLIGRAM(S): 400 CAPSULE ORAL at 14:39

## 2023-07-12 RX ADMIN — AMLODIPINE BESYLATE 10 MILLIGRAM(S): 2.5 TABLET ORAL at 10:17

## 2023-07-12 RX ADMIN — GABAPENTIN 300 MILLIGRAM(S): 400 CAPSULE ORAL at 21:20

## 2023-07-12 RX ADMIN — QUETIAPINE FUMARATE 50 MILLIGRAM(S): 200 TABLET, FILM COATED ORAL at 21:20

## 2023-07-12 NOTE — BH INPATIENT PSYCHIATRY PROGRESS NOTE - NSBHMSETHTPROC_PSY_A_CORE
Linear
Linear
Linear/Normal reasoning
Linear
Linear/Normal reasoning
Linear/Normal reasoning

## 2023-07-12 NOTE — BH INPATIENT PSYCHIATRY PROGRESS NOTE - PRN MEDS
MEDICATIONS  (PRN):  acetaminophen     Tablet .. 650 milliGRAM(s) Oral every 6 hours PRN Temp greater or equal to 38C (100.4F), Mild Pain (1 - 3), Moderate Pain (4 - 6), Severe Pain (7 - 10)  aluminum hydroxide/magnesium hydroxide/simethicone Suspension 30 milliLiter(s) Oral every 6 hours PRN Dyspepsia  benztropine 1 milliGRAM(s) Oral daily PRN Antipsychotic induced akathisia  diphenhydrAMINE 50 milliGRAM(s) Oral at bedtime PRN Insomnia  haloperidol     Tablet 5 milliGRAM(s) Oral every 6 hours PRN agitation  LORazepam     Tablet 2 milliGRAM(s) Oral every 6 hours PRN agitation/hallucinations  magnesium hydroxide Suspension 30 milliLiter(s) Oral daily PRN Constipation  nicotine  Polacrilex Gum 2 milliGRAM(s) Oral every 2 hours PRN nrt  senna 1 Tablet(s) Oral at bedtime PRN constipation  
MEDICATIONS  (PRN):  acetaminophen     Tablet .. 650 milliGRAM(s) Oral every 6 hours PRN Temp greater or equal to 38C (100.4F), Mild Pain (1 - 3), Moderate Pain (4 - 6), Severe Pain (7 - 10)  aluminum hydroxide/magnesium hydroxide/simethicone Suspension 30 milliLiter(s) Oral every 6 hours PRN Dyspepsia  benztropine 1 milliGRAM(s) Oral daily PRN Antipsychotic induced akathisia  diphenhydrAMINE 50 milliGRAM(s) Oral at bedtime PRN Insomnia  haloperidol     Tablet 5 milliGRAM(s) Oral every 6 hours PRN agitation  LORazepam     Tablet 2 milliGRAM(s) Oral every 6 hours PRN anxiety  magnesium hydroxide Suspension 30 milliLiter(s) Oral daily PRN Constipation  nicotine  Polacrilex Gum 2 milliGRAM(s) Oral every 2 hours PRN nrt  
MEDICATIONS  (PRN):  acetaminophen     Tablet .. 650 milliGRAM(s) Oral every 6 hours PRN Temp greater or equal to 38C (100.4F), Mild Pain (1 - 3), Moderate Pain (4 - 6), Severe Pain (7 - 10)  aluminum hydroxide/magnesium hydroxide/simethicone Suspension 30 milliLiter(s) Oral every 6 hours PRN Dyspepsia  benztropine 1 milliGRAM(s) Oral daily PRN Antipsychotic induced akathisia  diphenhydrAMINE 50 milliGRAM(s) Oral at bedtime PRN Insomnia  haloperidol     Tablet 5 milliGRAM(s) Oral every 6 hours PRN agitation  LORazepam     Tablet 2 milliGRAM(s) Oral every 6 hours PRN anxiety  magnesium hydroxide Suspension 30 milliLiter(s) Oral daily PRN Constipation  nicotine  Polacrilex Gum 2 milliGRAM(s) Oral every 2 hours PRN nrt  
MEDICATIONS  (PRN):  acetaminophen     Tablet .. 650 milliGRAM(s) Oral every 6 hours PRN Temp greater or equal to 38C (100.4F), Mild Pain (1 - 3), Moderate Pain (4 - 6), Severe Pain (7 - 10)  aluminum hydroxide/magnesium hydroxide/simethicone Suspension 30 milliLiter(s) Oral every 6 hours PRN Dyspepsia  benztropine 1 milliGRAM(s) Oral daily PRN Antipsychotic induced akathisia  diphenhydrAMINE 50 milliGRAM(s) Oral at bedtime PRN Insomnia  haloperidol     Tablet 5 milliGRAM(s) Oral every 6 hours PRN agitation  LORazepam     Tablet 2 milliGRAM(s) Oral every 6 hours PRN agitation/hallucinations  magnesium hydroxide Suspension 30 milliLiter(s) Oral daily PRN Constipation  nicotine  Polacrilex Gum 2 milliGRAM(s) Oral every 2 hours PRN nrt  senna 1 Tablet(s) Oral at bedtime PRN constipation  
MEDICATIONS  (PRN):  acetaminophen     Tablet .. 650 milliGRAM(s) Oral every 6 hours PRN Temp greater or equal to 38C (100.4F), Mild Pain (1 - 3), Moderate Pain (4 - 6), Severe Pain (7 - 10)  aluminum hydroxide/magnesium hydroxide/simethicone Suspension 30 milliLiter(s) Oral every 6 hours PRN Dyspepsia  diphenhydrAMINE 50 milliGRAM(s) Oral at bedtime PRN Insomnia  haloperidol     Tablet 5 milliGRAM(s) Oral every 6 hours PRN agitation  LORazepam     Tablet 2 milliGRAM(s) Oral every 6 hours PRN anxiety  magnesium hydroxide Suspension 30 milliLiter(s) Oral daily PRN Constipation  nicotine  Polacrilex Gum 2 milliGRAM(s) Oral every 2 hours PRN nrt  
MEDICATIONS  (PRN):  acetaminophen     Tablet .. 650 milliGRAM(s) Oral every 6 hours PRN Temp greater or equal to 38C (100.4F), Mild Pain (1 - 3), Moderate Pain (4 - 6), Severe Pain (7 - 10)  aluminum hydroxide/magnesium hydroxide/simethicone Suspension 30 milliLiter(s) Oral every 6 hours PRN Dyspepsia  benztropine 1 milliGRAM(s) Oral daily PRN Antipsychotic induced akathisia  diphenhydrAMINE 50 milliGRAM(s) Oral at bedtime PRN Insomnia  haloperidol     Tablet 5 milliGRAM(s) Oral every 6 hours PRN agitation  LORazepam     Tablet 2 milliGRAM(s) Oral every 6 hours PRN agitation/hallucinations  magnesium hydroxide Suspension 30 milliLiter(s) Oral daily PRN Constipation  nicotine  Polacrilex Gum 2 milliGRAM(s) Oral every 2 hours PRN nrt  senna 1 Tablet(s) Oral at bedtime PRN constipation  
MEDICATIONS  (PRN):  acetaminophen     Tablet .. 650 milliGRAM(s) Oral every 6 hours PRN Temp greater or equal to 38C (100.4F), Mild Pain (1 - 3), Moderate Pain (4 - 6), Severe Pain (7 - 10)  aluminum hydroxide/magnesium hydroxide/simethicone Suspension 30 milliLiter(s) Oral every 6 hours PRN Dyspepsia  benztropine 1 milliGRAM(s) Oral daily PRN Antipsychotic induced akathisia  diphenhydrAMINE 50 milliGRAM(s) Oral at bedtime PRN Insomnia  haloperidol     Tablet 5 milliGRAM(s) Oral every 6 hours PRN agitation  LORazepam     Tablet 2 milliGRAM(s) Oral every 6 hours PRN agitation/hallucinations  magnesium hydroxide Suspension 30 milliLiter(s) Oral daily PRN Constipation  nicotine  Polacrilex Gum 2 milliGRAM(s) Oral every 2 hours PRN nrt  senna 1 Tablet(s) Oral at bedtime PRN constipation  
MEDICATIONS  (PRN):  acetaminophen     Tablet .. 650 milliGRAM(s) Oral every 6 hours PRN Temp greater or equal to 38C (100.4F), Mild Pain (1 - 3), Moderate Pain (4 - 6), Severe Pain (7 - 10)  aluminum hydroxide/magnesium hydroxide/simethicone Suspension 30 milliLiter(s) Oral every 6 hours PRN Dyspepsia  benztropine 1 milliGRAM(s) Oral daily PRN Antipsychotic induced akathisia  diphenhydrAMINE 50 milliGRAM(s) Oral at bedtime PRN Insomnia  haloperidol     Tablet 5 milliGRAM(s) Oral every 6 hours PRN agitation  magnesium hydroxide Suspension 30 milliLiter(s) Oral daily PRN Constipation  nicotine  Polacrilex Gum 2 milliGRAM(s) Oral every 2 hours PRN nrt  
MEDICATIONS  (PRN):  acetaminophen     Tablet .. 650 milliGRAM(s) Oral every 6 hours PRN Temp greater or equal to 38C (100.4F), Mild Pain (1 - 3), Moderate Pain (4 - 6), Severe Pain (7 - 10)  aluminum hydroxide/magnesium hydroxide/simethicone Suspension 30 milliLiter(s) Oral every 6 hours PRN Dyspepsia  benztropine 1 milliGRAM(s) Oral daily PRN Antipsychotic induced akathisia  diphenhydrAMINE 50 milliGRAM(s) Oral at bedtime PRN Insomnia  haloperidol     Tablet 5 milliGRAM(s) Oral every 6 hours PRN agitation  magnesium hydroxide Suspension 30 milliLiter(s) Oral daily PRN Constipation  nicotine  Polacrilex Gum 2 milliGRAM(s) Oral every 2 hours PRN nrt  senna 1 Tablet(s) Oral at bedtime PRN constipation  
MEDICATIONS  (PRN):  acetaminophen     Tablet .. 650 milliGRAM(s) Oral every 6 hours PRN Temp greater or equal to 38C (100.4F), Mild Pain (1 - 3), Moderate Pain (4 - 6), Severe Pain (7 - 10)  aluminum hydroxide/magnesium hydroxide/simethicone Suspension 30 milliLiter(s) Oral every 6 hours PRN Dyspepsia  benztropine 1 milliGRAM(s) Oral daily PRN Antipsychotic induced akathisia  diphenhydrAMINE 50 milliGRAM(s) Oral at bedtime PRN Insomnia  haloperidol     Tablet 5 milliGRAM(s) Oral every 6 hours PRN agitation  LORazepam     Tablet 2 milliGRAM(s) Oral every 6 hours PRN anxiety  magnesium hydroxide Suspension 30 milliLiter(s) Oral daily PRN Constipation  nicotine  Polacrilex Gum 2 milliGRAM(s) Oral every 2 hours PRN nrt

## 2023-07-12 NOTE — BH INPATIENT PSYCHIATRY PROGRESS NOTE - NSTXPROBPSYCHO_PSY_ALL_CORE
PSYCHOTIC SYMPTOMS
pregnancy problem
PSYCHOTIC SYMPTOMS

## 2023-07-12 NOTE — BH TREATMENT PLAN - NSTXMANICINTERRN_PSY_ALL_CORE
Promote a calm enviornment. Encourage patient to utilize coping skills.
Promote a calm enviornment. Encourage patient to utilize coping skills. Establish therapeutic relationship allowing patient to express concerns and thoughts. Encourage patient to adhere to medication regimen.
Discuss previous challenging situations that were mastered to enhance self-esteem and self-efficacy.   Identify and utilize strengths; acknowledge progress and accomplishments.

## 2023-07-12 NOTE — BH INPATIENT PSYCHIATRY PROGRESS NOTE - NSICDXBHSECONDARYDX_PSY_ALL_CORE
Polysubstance use disorder   F19.90  Substance induced mood disorder   F19.94  Alcohol dependence, daily use   F10.20  

## 2023-07-12 NOTE — BH TREATMENT PLAN - NSTXVIOLNTINTERRN_PSY_ALL_CORE
Listen actively, observing verbal and nonverbal cues (e.g., irritability, confusion, lack of cooperation, demanding behavior, body posture, expression); take threats seriously. Utilize firm and concise communication; set limits, offer choices and propose alternatives. Offer PRN medications as needed.
Utilize firm and concise communication; set limits, offer choices and propose alternatives. Offer PRN medications as needed.

## 2023-07-12 NOTE — BH INPATIENT PSYCHIATRY PROGRESS NOTE - NSBHATTESTBILLONSITE_PSY_A_CORE
JANELLE to bill

## 2023-07-12 NOTE — BH TREATMENT PLAN - NSTXPSYCHOINTERRN_PSY_ALL_CORE
Monitor behavior frequently for signs of increased agitation or hyperactivity, psychosis, suicidality and risk for violence
Monitor behavior frequently for signs of increased agitation or hyperactivity, psychosis, suicidality and risk for violence
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
## 2023-07-12 NOTE — BH INPATIENT PSYCHIATRY PROGRESS NOTE - NSDCCRITERIA_PSY_ALL_CORE
Stabilization of sxs and mood

## 2023-07-12 NOTE — BH INPATIENT PSYCHIATRY PROGRESS NOTE - NSBHCONSULTIPREASON_PSY_A_CORE
danger to others; mental illness expected to respond to inpatient care
danger to others; mental illness expected to respond to inpatient care
other reason
danger to others; mental illness expected to respond to inpatient care

## 2023-07-12 NOTE — BH INPATIENT PSYCHIATRY PROGRESS NOTE - NSBHATTESTTYPEVISIT_PSY_A_CORE
Attending Only
On-site Attending supervising JANELLE (99XXX codes)
Attending Only
On-site Attending supervising JANELLE (99XXX codes)
On-site Attending supervising JANELLE (99XXX codes)

## 2023-07-12 NOTE — BH INPATIENT PSYCHIATRY PROGRESS NOTE - NSBHASSESSSUMMFT_PSY_ALL_CORE
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 etoh use. Of not pt required a total of 15mg of Haldol and mg of Ativan over a 13 hour period after arriving to [facility]. Utox positive for benzos and THC. Required potassium for repletion following initial K of 2.7 (currently at 4.1). Head CT completed, unremarkable. Pt transferred to [facility] and admitted to [address] [place] [address] status with seroquel 50mg qhs initiated.     Patient reporting worsening of mood sxs over the last several months but has had difficulty finding outpatient care. Alcohol and marijuana use has been consistent. Most recently has noticed worsening of sleep as well as paranoid ideations which has escalated to aggression towards family members. Pt is agreeable with medication and is open to trial of abilify to treat acute nelda as well as psychotic sxs.     [facility] protocol  Seroquel 50mg qhs for insomnia  Abilify 5mg qd for acute nelda will continue to titrate (pt is open to [facility] if abilify helps with sxs)  Will discuss medications to address etoh dependence when further stabilized  Will recommend alcohol/substance abuse treatment options outpatient  Will contact family members for collateral.    6/29 Compliant with regimen, bright, labile- tearful at times. Reports overall improvement in sleep. Denies psychotic sxs. Taking meds. Cogentin 1mg prn added to regimen for akathisia

## 2023-07-12 NOTE — BH INPATIENT PSYCHIATRY PROGRESS NOTE - NSBHMSEKNOWHOW_PSY_ALL_CORE
Current Events/Educational attainment

## 2023-07-12 NOTE — BH TREATMENT PLAN - NSTXALCDRGINTERMD_PSY_ALL_CORE
Psychopharmacology x15 minutes, CIWA protocol and appropriate prns in place

## 2023-07-12 NOTE — BH INPATIENT PSYCHIATRY PROGRESS NOTE - NSBHCHARTREVIEWVS_PSY_A_CORE FT
Vital Signs Last 24 Hrs  T(C): 37.1 (07-01-23 @ 21:04), Max: 37.1 (07-01-23 @ 16:04)  T(F): 98.8 (07-01-23 @ 21:04), Max: 98.8 (07-01-23 @ 21:04)  HR: 123 (07-01-23 @ 21:04) (119 - 150)  BP: 159/88 (07-01-23 @ 21:04) (149/103 - 168/116)  BP(mean): --  RR: 18 (07-01-23 @ 21:04) (16 - 18)  SpO2: 99% (07-01-23 @ 21:04) (98% - 99%)    
Vital Signs Last 24 Hrs  T(C): 36.7 (07-03-23 @ 20:10), Max: 36.9 (07-03-23 @ 16:51)  T(F): 98.1 (07-03-23 @ 20:10), Max: 98.5 (07-03-23 @ 16:51)  HR: 106 (07-03-23 @ 20:10) (106 - 122)  BP: 140/81 (07-03-23 @ 20:10) (125/87 - 187/90)  BP(mean): --  RR: 18 (07-03-23 @ 20:10) (18 - 18)  SpO2: 97% (07-03-23 @ 20:10) (96% - 99%)    
Vital Signs Last 24 Hrs  T(C): 36.8 (06-30-23 @ 08:43), Max: 36.9 (06-29-23 @ 16:17)  T(F): 98.3 (06-30-23 @ 08:43), Max: 98.5 (06-29-23 @ 16:17)  HR: 20 (06-30-23 @ 08:43) (20 - 87)  BP: 133/82 (06-30-23 @ 08:43) (133/82 - 166/83)  BP(mean): --  RR: 20 (06-30-23 @ 08:43) (18 - 20)  SpO2: 96% (06-30-23 @ 08:43) (96% - 100%)    
Vital Signs Last 24 Hrs  T(C): 36.9 (06-29-23 @ 16:17), Max: 37 (06-29-23 @ 08:38)  T(F): 98.5 (06-29-23 @ 16:17), Max: 98.6 (06-29-23 @ 08:38)  HR: 87 (06-29-23 @ 16:17) (87 - 92)  BP: 166/83 (06-29-23 @ 16:17) (145/93 - 166/83)  BP(mean): --  RR: 18 (06-29-23 @ 16:17) (18 - 18)  SpO2: 100% (06-29-23 @ 16:17) (97% - 100%)    
Vital Signs Last 24 Hrs  T(C): 36.7 (07-10-23 @ 09:25), Max: 36.8 (07-09-23 @ 16:49)  T(F): 98.1 (07-10-23 @ 09:25), Max: 98.3 (07-09-23 @ 16:49)  HR: 102 (07-10-23 @ 09:25) (102 - 102)  BP: 145/80 (07-10-23 @ 09:25) (145/80 - 157/93)  BP(mean): --  RR: 18 (07-10-23 @ 09:25) (18 - 18)  SpO2: 100% (07-10-23 @ 09:25) (98% - 100%)    
Vital Signs Last 24 Hrs  T(C): 36.4 (07-12-23 @ 16:54), Max: 36.4 (07-12-23 @ 16:54)  T(F): 97.5 (07-12-23 @ 16:54), Max: 97.5 (07-12-23 @ 16:54)  HR: 94 (07-12-23 @ 16:54) (94 - 94)  BP: 154/98 (07-12-23 @ 16:54) (154/98 - 154/98)  BP(mean): --  RR: 18 (07-12-23 @ 16:54) (18 - 18)  SpO2: 100% (07-12-23 @ 16:54) (100% - 100%)    
Vital Signs Last 24 Hrs  T(C): 36.9 (07-06-23 @ 08:40), Max: 36.9 (07-06-23 @ 08:40)  T(F): 98.4 (07-06-23 @ 08:40), Max: 98.4 (07-06-23 @ 08:40)  HR: 110 (07-06-23 @ 08:40) (105 - 110)  BP: 139/77 (07-06-23 @ 08:40) (139/77 - 168/105)  BP(mean): --  RR: 18 (07-06-23 @ 08:40) (18 - 18)  SpO2: 97% (07-06-23 @ 08:40) (97% - 99%)    
Vital Signs Last 24 Hrs  T(C): 36.6 (07-11-23 @ 17:36), Max: 36.6 (07-11-23 @ 17:36)  T(F): 97.8 (07-11-23 @ 17:36), Max: 97.8 (07-11-23 @ 17:36)  HR: 100 (07-11-23 @ 17:36) (100 - 100)  BP: 147/91 (07-11-23 @ 17:36) (147/91 - 147/91)  BP(mean): --  RR: --  SpO2: 99% (07-11-23 @ 17:36) (99% - 99%)    
Vital Signs Last 24 Hrs  T(C): 36.6 (07-11-23 @ 09:09), Max: 36.6 (07-11-23 @ 09:09)  T(F): 97.8 (07-11-23 @ 09:09), Max: 97.8 (07-11-23 @ 09:09)  HR: 97 (07-11-23 @ 09:09) (97 - 97)  BP: 130/78 (07-11-23 @ 09:09) (130/78 - 130/78)  BP(mean): --  RR: 18 (07-11-23 @ 09:09) (18 - 18)  SpO2: 100% (07-11-23 @ 09:09) (100% - 100%)    
Vital Signs Last 24 Hrs  T(C): 36.6 (07-05-23 @ 10:33), Max: 36.9 (07-05-23 @ 06:00)  T(F): 97.8 (07-05-23 @ 10:33), Max: 98.5 (07-05-23 @ 06:00)  HR: 108 (07-05-23 @ 10:33) (89 - 108)  BP: 162/116 (07-05-23 @ 10:34) (134/86 - 179/107)  BP(mean): --  RR: 18 (07-05-23 @ 10:33) (18 - 89)  SpO2: 100% (07-05-23 @ 10:33) (97% - 100%)

## 2023-07-12 NOTE — BH TREATMENT PLAN - NSTXCOPEINTERPR_PSY_ALL_CORE
Pt will be invited and encouraged to attend all offered groups
Pt will be invited and encouraged to attend all offered groups
Pt will continue to be invited and encouraged to attend all offered groups

## 2023-07-12 NOTE — BH INPATIENT PSYCHIATRY PROGRESS NOTE - CURRENT MEDICATION
MEDICATIONS  (STANDING):  ARIPiprazole 10 milliGRAM(s) Oral daily  nicotine -  14 mG/24Hr(s) Patch 1 Patch Transdermal daily  NIFEdipine XL 30 milliGRAM(s) Oral daily  QUEtiapine 50 milliGRAM(s) Oral once  QUEtiapine 50 milliGRAM(s) Oral at bedtime    MEDICATIONS  (PRN):  acetaminophen     Tablet .. 650 milliGRAM(s) Oral every 6 hours PRN Temp greater or equal to 38C (100.4F), Mild Pain (1 - 3), Moderate Pain (4 - 6), Severe Pain (7 - 10)  aluminum hydroxide/magnesium hydroxide/simethicone Suspension 30 milliLiter(s) Oral every 6 hours PRN Dyspepsia  benztropine 1 milliGRAM(s) Oral daily PRN Antipsychotic induced akathisia  diphenhydrAMINE 50 milliGRAM(s) Oral at bedtime PRN Insomnia  haloperidol     Tablet 5 milliGRAM(s) Oral every 6 hours PRN agitation  LORazepam     Tablet 2 milliGRAM(s) Oral every 6 hours PRN anxiety  magnesium hydroxide Suspension 30 milliLiter(s) Oral daily PRN Constipation  nicotine  Polacrilex Gum 2 milliGRAM(s) Oral every 2 hours PRN nrt  
MEDICATIONS  (STANDING):  ARIPiprazole 15 milliGRAM(s) Oral daily  gabapentin 300 milliGRAM(s) Oral three times a day  nicotine -  14 mG/24Hr(s) Patch 1 Patch Transdermal daily  NIFEdipine XL 30 milliGRAM(s) Oral two times a day  QUEtiapine 50 milliGRAM(s) Oral at bedtime  QUEtiapine 50 milliGRAM(s) Oral once    MEDICATIONS  (PRN):  acetaminophen     Tablet .. 650 milliGRAM(s) Oral every 6 hours PRN Temp greater or equal to 38C (100.4F), Mild Pain (1 - 3), Moderate Pain (4 - 6), Severe Pain (7 - 10)  aluminum hydroxide/magnesium hydroxide/simethicone Suspension 30 milliLiter(s) Oral every 6 hours PRN Dyspepsia  benztropine 1 milliGRAM(s) Oral daily PRN Antipsychotic induced akathisia  diphenhydrAMINE 50 milliGRAM(s) Oral at bedtime PRN Insomnia  haloperidol     Tablet 5 milliGRAM(s) Oral every 6 hours PRN agitation  magnesium hydroxide Suspension 30 milliLiter(s) Oral daily PRN Constipation  nicotine  Polacrilex Gum 2 milliGRAM(s) Oral every 2 hours PRN nrt  
MEDICATIONS  (STANDING):  ARIPiprazole 5 milliGRAM(s) Oral once  nicotine -  14 mG/24Hr(s) Patch 1 Patch Transdermal daily  QUEtiapine 50 milliGRAM(s) Oral at bedtime    MEDICATIONS  (PRN):  acetaminophen     Tablet .. 650 milliGRAM(s) Oral every 6 hours PRN Temp greater or equal to 38C (100.4F), Mild Pain (1 - 3), Moderate Pain (4 - 6), Severe Pain (7 - 10)  aluminum hydroxide/magnesium hydroxide/simethicone Suspension 30 milliLiter(s) Oral every 6 hours PRN Dyspepsia  benztropine 1 milliGRAM(s) Oral daily PRN Antipsychotic induced akathisia  diphenhydrAMINE 50 milliGRAM(s) Oral at bedtime PRN Insomnia  haloperidol     Tablet 5 milliGRAM(s) Oral every 6 hours PRN agitation  LORazepam     Tablet 2 milliGRAM(s) Oral every 6 hours PRN anxiety  magnesium hydroxide Suspension 30 milliLiter(s) Oral daily PRN Constipation  nicotine  Polacrilex Gum 2 milliGRAM(s) Oral every 2 hours PRN nrt  
MEDICATIONS  (STANDING):  amLODIPine   Tablet 10 milliGRAM(s) Oral daily  ARIPiprazole 20 milliGRAM(s) Oral daily  gabapentin 300 milliGRAM(s) Oral three times a day  naltrexone   Injectable,  Long Acting. 380 milliGRAM(s) IntraMuscular once  QUEtiapine 50 milliGRAM(s) Oral at bedtime  QUEtiapine 50 milliGRAM(s) Oral once    MEDICATIONS  (PRN):  acetaminophen     Tablet .. 650 milliGRAM(s) Oral every 6 hours PRN Temp greater or equal to 38C (100.4F), Mild Pain (1 - 3), Moderate Pain (4 - 6), Severe Pain (7 - 10)  aluminum hydroxide/magnesium hydroxide/simethicone Suspension 30 milliLiter(s) Oral every 6 hours PRN Dyspepsia  benztropine 1 milliGRAM(s) Oral daily PRN Antipsychotic induced akathisia  diphenhydrAMINE 50 milliGRAM(s) Oral at bedtime PRN Insomnia  haloperidol     Tablet 5 milliGRAM(s) Oral every 6 hours PRN agitation  LORazepam     Tablet 2 milliGRAM(s) Oral every 6 hours PRN agitation/hallucinations  magnesium hydroxide Suspension 30 milliLiter(s) Oral daily PRN Constipation  nicotine  Polacrilex Gum 2 milliGRAM(s) Oral every 2 hours PRN nrt  senna 1 Tablet(s) Oral at bedtime PRN constipation  
MEDICATIONS  (STANDING):  ARIPiprazole 5 milliGRAM(s) Oral daily  nicotine -  14 mG/24Hr(s) Patch 1 Patch Transdermal daily  QUEtiapine 50 milliGRAM(s) Oral at bedtime    MEDICATIONS  (PRN):  acetaminophen     Tablet .. 650 milliGRAM(s) Oral every 6 hours PRN Temp greater or equal to 38C (100.4F), Mild Pain (1 - 3), Moderate Pain (4 - 6), Severe Pain (7 - 10)  aluminum hydroxide/magnesium hydroxide/simethicone Suspension 30 milliLiter(s) Oral every 6 hours PRN Dyspepsia  diphenhydrAMINE 50 milliGRAM(s) Oral at bedtime PRN Insomnia  haloperidol     Tablet 5 milliGRAM(s) Oral every 6 hours PRN agitation  LORazepam     Tablet 2 milliGRAM(s) Oral every 6 hours PRN anxiety  magnesium hydroxide Suspension 30 milliLiter(s) Oral daily PRN Constipation  nicotine  Polacrilex Gum 2 milliGRAM(s) Oral every 2 hours PRN nrt  
MEDICATIONS  (STANDING):  amLODIPine   Tablet 10 milliGRAM(s) Oral daily  ARIPiprazole 15 milliGRAM(s) Oral daily  gabapentin 300 milliGRAM(s) Oral three times a day  nicotine -  14 mG/24Hr(s) Patch 1 Patch Transdermal daily  QUEtiapine 50 milliGRAM(s) Oral at bedtime  QUEtiapine 50 milliGRAM(s) Oral once    MEDICATIONS  (PRN):  acetaminophen     Tablet .. 650 milliGRAM(s) Oral every 6 hours PRN Temp greater or equal to 38C (100.4F), Mild Pain (1 - 3), Moderate Pain (4 - 6), Severe Pain (7 - 10)  aluminum hydroxide/magnesium hydroxide/simethicone Suspension 30 milliLiter(s) Oral every 6 hours PRN Dyspepsia  benztropine 1 milliGRAM(s) Oral daily PRN Antipsychotic induced akathisia  diphenhydrAMINE 50 milliGRAM(s) Oral at bedtime PRN Insomnia  haloperidol     Tablet 5 milliGRAM(s) Oral every 6 hours PRN agitation  LORazepam     Tablet 2 milliGRAM(s) Oral every 6 hours PRN agitation/hallucinations  magnesium hydroxide Suspension 30 milliLiter(s) Oral daily PRN Constipation  nicotine  Polacrilex Gum 2 milliGRAM(s) Oral every 2 hours PRN nrt  senna 1 Tablet(s) Oral at bedtime PRN constipation  
MEDICATIONS  (STANDING):  amLODIPine   Tablet 10 milliGRAM(s) Oral daily  ARIPiprazole 20 milliGRAM(s) Oral daily  gabapentin 300 milliGRAM(s) Oral three times a day  naltrexone 50 milliGRAM(s) Oral once  nicotine -  14 mG/24Hr(s) Patch 1 Patch Transdermal daily  QUEtiapine 50 milliGRAM(s) Oral at bedtime  QUEtiapine 50 milliGRAM(s) Oral once    MEDICATIONS  (PRN):  acetaminophen     Tablet .. 650 milliGRAM(s) Oral every 6 hours PRN Temp greater or equal to 38C (100.4F), Mild Pain (1 - 3), Moderate Pain (4 - 6), Severe Pain (7 - 10)  aluminum hydroxide/magnesium hydroxide/simethicone Suspension 30 milliLiter(s) Oral every 6 hours PRN Dyspepsia  benztropine 1 milliGRAM(s) Oral daily PRN Antipsychotic induced akathisia  diphenhydrAMINE 50 milliGRAM(s) Oral at bedtime PRN Insomnia  haloperidol     Tablet 5 milliGRAM(s) Oral every 6 hours PRN agitation  LORazepam     Tablet 2 milliGRAM(s) Oral every 6 hours PRN agitation/hallucinations  magnesium hydroxide Suspension 30 milliLiter(s) Oral daily PRN Constipation  nicotine  Polacrilex Gum 2 milliGRAM(s) Oral every 2 hours PRN nrt  senna 1 Tablet(s) Oral at bedtime PRN constipation  
MEDICATIONS  (STANDING):  ARIPiprazole 10 milliGRAM(s) Oral daily  nicotine -  14 mG/24Hr(s) Patch 1 Patch Transdermal daily  QUEtiapine 50 milliGRAM(s) Oral at bedtime    MEDICATIONS  (PRN):  acetaminophen     Tablet .. 650 milliGRAM(s) Oral every 6 hours PRN Temp greater or equal to 38C (100.4F), Mild Pain (1 - 3), Moderate Pain (4 - 6), Severe Pain (7 - 10)  aluminum hydroxide/magnesium hydroxide/simethicone Suspension 30 milliLiter(s) Oral every 6 hours PRN Dyspepsia  benztropine 1 milliGRAM(s) Oral daily PRN Antipsychotic induced akathisia  diphenhydrAMINE 50 milliGRAM(s) Oral at bedtime PRN Insomnia  haloperidol     Tablet 5 milliGRAM(s) Oral every 6 hours PRN agitation  LORazepam     Tablet 2 milliGRAM(s) Oral every 6 hours PRN anxiety  magnesium hydroxide Suspension 30 milliLiter(s) Oral daily PRN Constipation  nicotine  Polacrilex Gum 2 milliGRAM(s) Oral every 2 hours PRN nrt  
MEDICATIONS  (STANDING):  amLODIPine   Tablet 10 milliGRAM(s) Oral daily  ARIPiprazole 15 milliGRAM(s) Oral daily  gabapentin 300 milliGRAM(s) Oral three times a day  nicotine -  14 mG/24Hr(s) Patch 1 Patch Transdermal daily  QUEtiapine 50 milliGRAM(s) Oral at bedtime  QUEtiapine 50 milliGRAM(s) Oral once    MEDICATIONS  (PRN):  acetaminophen     Tablet .. 650 milliGRAM(s) Oral every 6 hours PRN Temp greater or equal to 38C (100.4F), Mild Pain (1 - 3), Moderate Pain (4 - 6), Severe Pain (7 - 10)  aluminum hydroxide/magnesium hydroxide/simethicone Suspension 30 milliLiter(s) Oral every 6 hours PRN Dyspepsia  benztropine 1 milliGRAM(s) Oral daily PRN Antipsychotic induced akathisia  diphenhydrAMINE 50 milliGRAM(s) Oral at bedtime PRN Insomnia  haloperidol     Tablet 5 milliGRAM(s) Oral every 6 hours PRN agitation  LORazepam     Tablet 2 milliGRAM(s) Oral every 6 hours PRN agitation/hallucinations  magnesium hydroxide Suspension 30 milliLiter(s) Oral daily PRN Constipation  nicotine  Polacrilex Gum 2 milliGRAM(s) Oral every 2 hours PRN nrt  senna 1 Tablet(s) Oral at bedtime PRN constipation  
MEDICATIONS  (STANDING):  amLODIPine   Tablet 10 milliGRAM(s) Oral daily  ARIPiprazole 20 milliGRAM(s) Oral daily  gabapentin 300 milliGRAM(s) Oral three times a day  QUEtiapine 50 milliGRAM(s) Oral once  QUEtiapine 50 milliGRAM(s) Oral at bedtime    MEDICATIONS  (PRN):  acetaminophen     Tablet .. 650 milliGRAM(s) Oral every 6 hours PRN Temp greater or equal to 38C (100.4F), Mild Pain (1 - 3), Moderate Pain (4 - 6), Severe Pain (7 - 10)  aluminum hydroxide/magnesium hydroxide/simethicone Suspension 30 milliLiter(s) Oral every 6 hours PRN Dyspepsia  benztropine 1 milliGRAM(s) Oral daily PRN Antipsychotic induced akathisia  diphenhydrAMINE 50 milliGRAM(s) Oral at bedtime PRN Insomnia  haloperidol     Tablet 5 milliGRAM(s) Oral every 6 hours PRN agitation  magnesium hydroxide Suspension 30 milliLiter(s) Oral daily PRN Constipation  nicotine  Polacrilex Gum 2 milliGRAM(s) Oral every 2 hours PRN nrt  senna 1 Tablet(s) Oral at bedtime PRN constipation

## 2023-07-12 NOTE — PROGRESS NOTE ADULT - ASSESSMENT
37 yo F, domiciled with family, unemployed, history of bipolar disorder, alcohol use (3 glasses of wine per night, last use 1-2 weeks ago), nicotine use disorder (jewel, 1 pod per day since 17 years old) who was brought in by family for manic behavior, currently admitted to inpatient psych. Med consulted for hypertension and tachycardia.     # HTN with tachycardia -- IMPROVING   - BP improving on [id number] daily, continue monitor BP, consider adding HCTZ 12.5mg po daily or changing Amlodipine back to Nifedipine XL at 60mg po daily  - continue Amlodipine 10mg po daily for now     # Bipolar d/o   # Manic episode  - active management per Psychiatry team   - ARIPiprazole 10 milliGRAM(s) Oral daily  - QUEtiapine 50 milliGRAM(s) Oral at bedtime  benztropine 1 milliGRAM(s) Oral daily PRN Antipsychotic induced akathisia  diphenhydrAMINE 50 milliGRAM(s) Oral at bedtime PRN Insomnia  haloperidol     Tablet 5 milliGRAM(s) Oral every 6 hours PRN agitation  LORazepam     Tablet 2 milliGRAM(s) Oral every 6 hours PRN anxiety    # Cig smoking  nicotine  Polacrilex Gum 2 milliGRAM(s) Oral every 2 hours PRN nrt    thank you for allowing medicine to participate in the care

## 2023-07-12 NOTE — BH INPATIENT PSYCHIATRY PROGRESS NOTE - NSBHMETABOLIC_PSY_ALL_CORE_FT
BMI: BMI (kg/m2): 33.1 (06-27-23 @ 18:04)  HbA1c: A1C with Estimated Average Glucose Result: 4.7 % (06-29-23 @ 07:37)    Glucose:   BP: 162/116 (07-05-23 @ 10:34) (125/87 - 187/90)  Lipid Panel: Date/Time: 06-28-23 @ 10:58  Cholesterol, Serum: 137  Direct LDL: --  HDL Cholesterol, Serum: 39  Total Cholesterol/HDL Ration Measurement: --  Triglycerides, Serum: 64  
BMI: BMI (kg/m2): 33.1 (06-27-23 @ 18:04)  HbA1c: A1C with Estimated Average Glucose Result: 4.7 % (06-29-23 @ 07:37)    Glucose:   BP: 139/77 (07-06-23 @ 08:40) (134/86 - 187/90)  Lipid Panel: Date/Time: 06-28-23 @ 10:58  Cholesterol, Serum: 137  Direct LDL: --  HDL Cholesterol, Serum: 39  Total Cholesterol/HDL Ration Measurement: --  Triglycerides, Serum: 64  
BMI: BMI (kg/m2): 33.1 (06-27-23 @ 18:04)  HbA1c: A1C with Estimated Average Glucose Result: 4.7 % (06-29-23 @ 07:37)    Glucose:   BP: 130/78 (07-11-23 @ 09:09) (130/78 - 160/100)  Lipid Panel: Date/Time: 06-28-23 @ 10:58  Cholesterol, Serum: 137  Direct LDL: --  HDL Cholesterol, Serum: 39  Total Cholesterol/HDL Ration Measurement: --  Triglycerides, Serum: 64  
BMI: BMI (kg/m2): 33.1 (06-27-23 @ 18:04)  HbA1c: A1C with Estimated Average Glucose Result: 4.7 % (06-29-23 @ 07:37)    Glucose:   BP: 147/91 (07-11-23 @ 17:36) (130/78 - 157/93)  Lipid Panel: Date/Time: 06-28-23 @ 10:58  Cholesterol, Serum: 137  Direct LDL: --  HDL Cholesterol, Serum: 39  Total Cholesterol/HDL Ration Measurement: --  Triglycerides, Serum: 64  
BMI: BMI (kg/m2): 33.1 (06-27-23 @ 18:04)  HbA1c: A1C with Estimated Average Glucose Result: 4.7 % (06-29-23 @ 07:37)    Glucose:   BP: 133/82 (06-30-23 @ 08:43) (133/82 - 166/83)  Lipid Panel: Date/Time: 06-28-23 @ 10:58  Cholesterol, Serum: 137  Direct LDL: --  HDL Cholesterol, Serum: 39  Total Cholesterol/HDL Ration Measurement: --  Triglycerides, Serum: 64  
BMI: BMI (kg/m2): 33.1 (06-27-23 @ 18:04)  HbA1c: A1C with Estimated Average Glucose Result: 4.7 % (06-29-23 @ 07:37)    Glucose:   BP: 145/80 (07-10-23 @ 09:25) (135/89 - 170/99)  Lipid Panel: Date/Time: 06-28-23 @ 10:58  Cholesterol, Serum: 137  Direct LDL: --  HDL Cholesterol, Serum: 39  Total Cholesterol/HDL Ration Measurement: --  Triglycerides, Serum: 64  
BMI: BMI (kg/m2): 33.1 (06-27-23 @ 18:04)  HbA1c: A1C with Estimated Average Glucose Result: 4.7 % (06-29-23 @ 07:37)    Glucose:   BP: 166/83 (06-29-23 @ 16:17) (141/87 - 166/83)  Lipid Panel: Date/Time: 06-28-23 @ 10:58  Cholesterol, Serum: 137  Direct LDL: --  HDL Cholesterol, Serum: 39  Total Cholesterol/HDL Ration Measurement: --  Triglycerides, Serum: 64  
BMI: BMI (kg/m2): 33.1 (06-27-23 @ 18:04)  HbA1c: A1C with Estimated Average Glucose Result: 4.7 % (06-29-23 @ 07:37)    Glucose:   BP: 140/81 (07-03-23 @ 20:10) (125/87 - 187/90)  Lipid Panel: Date/Time: 06-28-23 @ 10:58  Cholesterol, Serum: 137  Direct LDL: --  HDL Cholesterol, Serum: 39  Total Cholesterol/HDL Ration Measurement: --  Triglycerides, Serum: 64  
BMI: BMI (kg/m2): 33.1 (06-27-23 @ 18:04)  HbA1c: A1C with Estimated Average Glucose Result: 4.7 % (06-29-23 @ 07:37)    Glucose:   BP: 159/88 (07-01-23 @ 21:04) (133/82 - 175/92)  Lipid Panel: Date/Time: 06-28-23 @ 10:58  Cholesterol, Serum: 137  Direct LDL: --  HDL Cholesterol, Serum: 39  Total Cholesterol/HDL Ration Measurement: --  Triglycerides, Serum: 64  
BMI: BMI (kg/m2): 33.1 (06-27-23 @ 18:04)  HbA1c: A1C with Estimated Average Glucose Result: 4.7 % (06-29-23 @ 07:37)    Glucose:   BP: 154/98 (07-12-23 @ 16:54) (130/78 - 155/100)  Lipid Panel: Date/Time: 06-28-23 @ 10:58  Cholesterol, Serum: 137  Direct LDL: --  HDL Cholesterol, Serum: 39  Total Cholesterol/HDL Ration Measurement: --  Triglycerides, Serum: 64

## 2023-07-12 NOTE — BH INPATIENT PSYCHIATRY PROGRESS NOTE - NSTXCOPEDATETRGT_PSY_ALL_CORE
12-Jul-2023
12-Jul-2023
05-Jul-2023
19-Jul-2023
05-Jul-2023
05-Jul-2023
12-Jul-2023
05-Jul-2023
12-Jul-2023
19-Jul-2023

## 2023-07-12 NOTE — BH INPATIENT PSYCHIATRY PROGRESS NOTE - NSTXPROBVIOLNT_PSY_ALL_CORE
VIOLENT/AGGRESSIVE BEHAVIOR

## 2023-07-12 NOTE — BH INPATIENT PSYCHIATRY PROGRESS NOTE - NSTXPSYCHOGOAL_PSY_ALL_CORE
Will identify 1 trigger/stressor that exacerbates hallucinations
Will identify 1 trigger/stressor that exacerbates thoughts
Will identify 1 trigger/stressor that exacerbates hallucinations
Will identify 1 trigger/stressor that exacerbates hallucinations
Will identify 1 trigger/stressor that exacerbates thoughts
Will identify 1 trigger/stressor that exacerbates hallucinations
Will identify 1 trigger/stressor that exacerbates thoughts

## 2023-07-12 NOTE — BH TREATMENT PLAN - NSTXCOPEINTERRN_PSY_ALL_CORE
Encourage patient to utilize coping skills. Establish therapeutic relationship allowing patient to express concerns and thoughts.
Discuss previous challenging situations that were mastered to enhance self-esteem and self-efficacy.   Identify and utilize strengths; acknowledge progress and accomplishments.
Discuss previous challenging situations that were mastered to enhance self-esteem and self-efficacy.   Identify and utilize strengths; acknowledge progress and accomplishments.

## 2023-07-12 NOTE — BH INPATIENT PSYCHIATRY PROGRESS NOTE - NSBHATTESTBILLING_PSY_A_CORE
03425-Gunwstukwo OBS or IP - high complexity OR 50-79 mins
63877-Vyyibguhjj OBS or IP - high complexity OR 50-79 mins
81285-Ujdeyrtrnn OBS or IP - high complexity OR 50-79 mins
02484-Cdvfaprauu OBS or IP - moderate complexity OR 35-49 mins
14316-Pnidpkegtb OBS or IP - moderate complexity OR 35-49 mins
[id number]-Bkfuygyuzw OBS or IP - high complexity OR 50-79 mins
96233-Ylisezoppp OBS or IP - high complexity OR 50-79 mins
64579-Pnrjdtmawe OBS or IP - high complexity OR 50-79 mins
08166-Mpqggzawyk OBS or IP - high complexity OR 50-79 mins
19362-Izyfgjrvtr OBS or IP - high complexity OR 50-79 mins

## 2023-07-12 NOTE — BH INPATIENT PSYCHIATRY PROGRESS NOTE - NSBHPROGMEDSE_PSY_A_CORE FT
mild akathisia

## 2023-07-12 NOTE — BH INPATIENT PSYCHIATRY PROGRESS NOTE - NSTXANXDATEEST_PSY_ALL_CORE
27-Jun-2023

## 2023-07-12 NOTE — BH INPATIENT PSYCHIATRY PROGRESS NOTE - NSTXALCDRGGOAL_PSY_ALL_CORE
Score on withdrawal scale will be WNL

## 2023-07-12 NOTE — BH INPATIENT PSYCHIATRY PROGRESS NOTE - NSTXALCDRGINTERMD_PSY_ALL_CORE
Psychopharmacology x15 minutes, CIWA protocol and appropriate prns in place

## 2023-07-12 NOTE — BH INPATIENT PSYCHIATRY PROGRESS NOTE - NSBHMSESPEECH_PSY_A_CORE
Normal volume, rate, productivity, spontaneity and articulation
Abnormal as indicated, otherwise normal...
Normal volume, rate, productivity, spontaneity and articulation

## 2023-07-12 NOTE — BH INPATIENT PSYCHIATRY PROGRESS NOTE - NSTXALCDRGDATETRGT_PSY_ALL_CORE
05-Jul-2023

## 2023-07-12 NOTE — BH INPATIENT PSYCHIATRY PROGRESS NOTE - NSTXMANICDATETRGT_PSY_ALL_CORE
05-Jul-2023
16-Jul-2023
05-Jul-2023
16-Jul-2023
05-Jul-2023
16-Jul-2023
05-Jul-2023
16-Jul-2023

## 2023-07-12 NOTE — BH INPATIENT PSYCHIATRY PROGRESS NOTE - NSTXCOPEDATEEST_PSY_ALL_CORE
05-Jul-2023
28-Jun-2023
28-Jun-2023
12-Jul-2023
28-Jun-2023
12-Jul-2023
28-Jun-2023
05-Jul-2023

## 2023-07-12 NOTE — BH TREATMENT PLAN - NSTXVIOLNTGOAL_PSY_ALL_CORE
Will demonstrate 2 problem solving strategies to decrease aggressive behavior

## 2023-07-12 NOTE — BH INPATIENT PSYCHIATRY PROGRESS NOTE - NSICDXBHTERTIARYDX_PSY_ALL_CORE
R/O Schizoaffective disorder   F25.9  

## 2023-07-12 NOTE — PROGRESS NOTE ADULT - SUBJECTIVE AND OBJECTIVE BOX
SUBJECTIVE / INTERVAL HPI: Patient seen and examined at bedside. Patient states she feels great at this time and having no issues.     VITAL SIGNS:  Vital Signs Last 24 Hrs  T(C): 36.6 (11 Jul 2023 17:36), Max: 36.6 (11 Jul 2023 17:36)  T(F): 97.8 (11 Jul 2023 17:36), Max: 97.8 (11 Jul 2023 17:36)  HR: 100 (11 Jul 2023 17:36) (100 - 100)  BP: 147/91 (11 Jul 2023 17:36) (147/91 - 147/91)  BP(mean): --  RR: --  SpO2: 99% (11 Jul 2023 17:36) (99% - 99%)        PHYSICAL EXAM:    General: WDWN  HEENT: NC/AT; PERRL, anicteric sclera; MMM  Cardiovascular: +S1/S2; RRR  Respiratory: CTA B/L; no W/R/R  Gastrointestinal: soft, NT/ND; +BSx4  Extremities: WWP; no edema, clubbing or cyanosis  Vascular: 2+ radial, DP/PT pulses B/L  Neurological: AAOx3; no focal deficits    MEDICATIONS:  MEDICATIONS  (STANDING):  amLODIPine   Tablet 10 milliGRAM(s) Oral daily  ARIPiprazole 20 milliGRAM(s) Oral daily  gabapentin 300 milliGRAM(s) Oral three times a day  naltrexone   Injectable,  Long Acting. 380 milliGRAM(s) IntraMuscular once  nicotine -  14 mG/24Hr(s) Patch 1 Patch Transdermal daily  QUEtiapine 50 milliGRAM(s) Oral at bedtime  QUEtiapine 50 milliGRAM(s) Oral once    MEDICATIONS  (PRN):  acetaminophen     Tablet .. 650 milliGRAM(s) Oral every 6 hours PRN Temp greater or equal to 38C (100.4F), Mild Pain (1 - 3), Moderate Pain (4 - 6), Severe Pain (7 - 10)  aluminum hydroxide/magnesium hydroxide/simethicone Suspension 30 milliLiter(s) Oral every 6 hours PRN Dyspepsia  benztropine 1 milliGRAM(s) Oral daily PRN Antipsychotic induced akathisia  diphenhydrAMINE 50 milliGRAM(s) Oral at bedtime PRN Insomnia  haloperidol     Tablet 5 milliGRAM(s) Oral every 6 hours PRN agitation  LORazepam     Tablet 2 milliGRAM(s) Oral every 6 hours PRN agitation/hallucinations  magnesium hydroxide Suspension 30 milliLiter(s) Oral daily PRN Constipation  nicotine  Polacrilex Gum 2 milliGRAM(s) Oral every 2 hours PRN nrt  senna 1 Tablet(s) Oral at bedtime PRN constipation      ALLERGIES:  Allergies    amoxicillin (Rash)    Intolerances        LABS:              CAPILLARY BLOOD GLUCOSE          RADIOLOGY & ADDITIONAL TESTS: Reviewed.    ASSESSMENT:    PLAN:

## 2023-07-12 NOTE — BH INPATIENT PSYCHIATRY PROGRESS NOTE - NSTXVIOLNTDATETRGT_PSY_ALL_CORE
16-Jul-2023
05-Jul-2023
05-Jul-2023
16-Jul-2023
05-Jul-2023
16-Jul-2023
16-Jul-2023
05-Jul-2023

## 2023-07-12 NOTE — BH INPATIENT PSYCHIATRY PROGRESS NOTE - NSTXPSYCHODATEEST_PSY_ALL_CORE
28-Jun-2023

## 2023-07-12 NOTE — BH TREATMENT PLAN - NSTXPSYCHOGOAL_PSY_ALL_CORE
Will identify 1 trigger/stressor that exacerbates thoughts
Will identify 1 trigger/stressor that exacerbates hallucinations
Will identify 1 trigger/stressor that exacerbates thoughts

## 2023-07-12 NOTE — BH INPATIENT PSYCHIATRY PROGRESS NOTE - NSTXPSYCHOINTERMD_PSY_ALL_CORE
Psychopharmacology x15 minutes

## 2023-07-12 NOTE — BH INPATIENT PSYCHIATRY PROGRESS NOTE - NSBHCHARTREVIEWINVESTIGATE_PSY_A_CORE FT
ACC: [id number] EXAM:  CT BRAIN   ORDERED BY: DAKOTAH BLEVINS     PROCEDURE DATE:  06/25/2023          INTERPRETATION:  EXAM: CT head without contrast    INDICATION: Altered mental status    TECHNIQUE: CT examination of the head performed without contrast.   Multiple axial images obtained from skull base to vertex.   Sagittal/coronal reformatted images obtained from axial data set. Images   reviewed in soft tissue and bone windows.      COMPARISON: None available.    FINDINGS:    Ventricles and sulci are normal in size and configuration for patient's   age. No hydrocephalus. No extra-axial fluid collection identified.    No acute intracranial hemorrhage identified. Supratentorial white matter   is unremarkable in attenuation. Gray-white differentiation is preserved   without CT evidence for acute transcortical infarction. There is no   significant midline shift, mass effect or herniation.    Sellar and suprasellar region are unremarkable in appearance.    Visualized paranasal sinuses and mastoid air cells are well aerated. No   significant cerebellar tonsillar herniation.    No displaced calvarial fractures are identified. No suspicious expansile   or destructive calvarial lesion identified. No significant scalp soft   tissue swelling identified.  
ACC: [id number] EXAM:  CT BRAIN   ORDERED BY: DAKOTAH BLEVINS     PROCEDURE DATE:  06/25/2023          INTERPRETATION:  EXAM: CT head without contrast    INDICATION: Altered mental status    TECHNIQUE: CT examination of the head performed without contrast.   Multiple axial images obtained from skull base to vertex.   Sagittal/coronal reformatted images obtained from axial data set. Images   reviewed in soft tissue and bone windows.      COMPARISON: None available.    FINDINGS:    Ventricles and sulci are normal in size and configuration for patient's   age. No hydrocephalus. No extra-axial fluid collection identified.    No acute intracranial hemorrhage identified. Supratentorial white matter   is unremarkable in attenuation. Gray-white differentiation is preserved   without CT evidence for acute transcortical infarction. There is no   significant midline shift, mass effect or herniation.    Sellar and suprasellar region are unremarkable in appearance.    Visualized paranasal sinuses and mastoid air cells are well aerated. No   significant cerebellar tonsillar herniation.    No displaced calvarial fractures are identified. No suspicious expansile   or destructive calvarial lesion identified. No significant scalp soft   tissue swelling identified.  
ACC: [id number] EXAM:  CT BRAIN   ORDERED BY: DAKOTAH BLEVINS     PROCEDURE DATE:  06/25/2023          INTERPRETATION:  EXAM: CT head without contrast    INDICATION: Altered mental status    TECHNIQUE: CT examination of the head performed without contrast.   Multiple axial images obtained from skull base to vertex.   Sagittal/coronal reformatted images obtained from axial data set. Images   reviewed in soft tissue and bone windows.      COMPARISON: None available.    FINDINGS:    Ventricles and sulci are normal in size and configuration for patient's   age. No hydrocephalus. No extra-axial fluid collection identified.    No acute intracranial hemorrhage identified. Supratentorial white matter   is unremarkable in attenuation. Gray-white differentiation is preserved   without CT evidence for acute transcortical infarction. There is no   significant midline shift, mass effect or herniation.    Sellar and suprasellar region are unremarkable in appearance.    Visualized paranasal sinuses and mastoid air cells are well aerated. No   significant cerebellar tonsillar herniation.    No displaced calvarial fractures are identified. No suspicious expansile   or destructive calvarial lesion identified. No significant scalp soft   tissue swelling identified.  
ACC: [id number] EXAM:  CT BRAIN   ORDERED BY: DAKOTAH BLEVINS     PROCEDURE DATE:  06/25/2023          INTERPRETATION:  EXAM: CT head without contrast    INDICATION: Altered mental status    TECHNIQUE: CT examination of the head performed without contrast.   Multiple axial images obtained from skull base to vertex.   Sagittal/coronal reformatted images obtained from axial data set. Images   reviewed in soft tissue and bone windows.      COMPARISON: None available.    FINDINGS:    Ventricles and sulci are normal in size and configuration for patient's   age. No hydrocephalus. No extra-axial fluid collection identified.    No acute intracranial hemorrhage identified. Supratentorial white matter   is unremarkable in attenuation. Gray-white differentiation is preserved   without CT evidence for acute transcortical infarction. There is no   significant midline shift, mass effect or herniation.    Sellar and suprasellar region are unremarkable in appearance.    Visualized paranasal sinuses and mastoid air cells are well aerated. No   significant cerebellar tonsillar herniation.    No displaced calvarial fractures are identified. No suspicious expansile   or destructive calvarial lesion identified. No significant scalp soft   tissue swelling identified.  
ACC: [id number] EXAM:  CT BRAIN   ORDERED BY: DAKOTAH BLEVINS     PROCEDURE DATE:  06/25/2023          INTERPRETATION:  EXAM: CT head without contrast    INDICATION: Altered mental status    TECHNIQUE: CT examination of the head performed without contrast.   Multiple axial images obtained from skull base to vertex.   Sagittal/coronal reformatted images obtained from axial data set. Images   reviewed in soft tissue and bone windows.      COMPARISON: None available.    FINDINGS:    Ventricles and sulci are normal in size and configuration for patient's   age. No hydrocephalus. No extra-axial fluid collection identified.    No acute intracranial hemorrhage identified. Supratentorial white matter   is unremarkable in attenuation. Gray-white differentiation is preserved   without CT evidence for acute transcortical infarction. There is no   significant midline shift, mass effect or herniation.    Sellar and suprasellar region are unremarkable in appearance.    Visualized paranasal sinuses and mastoid air cells are well aerated. No   significant cerebellar tonsillar herniation.    No displaced calvarial fractures are identified. No suspicious expansile   or destructive calvarial lesion identified. No significant scalp soft   tissue swelling identified.  
ACC: [id number] EXAM:  CT BRAIN   ORDERED BY: DAKOTAH BLEVINS     PROCEDURE DATE:  06/25/2023          INTERPRETATION:  EXAM: CT head without contrast    INDICATION: Altered mental status    TECHNIQUE: CT examination of the head performed without contrast.   Multiple axial images obtained from skull base to vertex.   Sagittal/coronal reformatted images obtained from axial data set. Images   reviewed in soft tissue and bone windows.      COMPARISON: None available.    FINDINGS:    Ventricles and sulci are normal in size and configuration for patient's   age. No hydrocephalus. No extra-axial fluid collection identified.    No acute intracranial hemorrhage identified. Supratentorial white matter   is unremarkable in attenuation. Gray-white differentiation is preserved   without CT evidence for acute transcortical infarction. There is no   significant midline shift, mass effect or herniation.    Sellar and suprasellar region are unremarkable in appearance.    Visualized paranasal sinuses and mastoid air cells are well aerated. No   significant cerebellar tonsillar herniation.    No displaced calvarial fractures are identified. No suspicious expansile   or destructive calvarial lesion identified. No significant scalp soft   tissue swelling identified.  
ACC: [id number] EXAM:  CT BRAIN   ORDERED BY: DAKOTAH BLEVINS     PROCEDURE DATE:  06/25/2023          INTERPRETATION:  EXAM: CT head without contrast    INDICATION: Altered mental status    TECHNIQUE: CT examination of the head performed without contrast.   Multiple axial images obtained from skull base to vertex.   Sagittal/coronal reformatted images obtained from axial data set. Images   reviewed in soft tissue and bone windows.      COMPARISON: None available.    FINDINGS:    Ventricles and sulci are normal in size and configuration for patient's   age. No hydrocephalus. No extra-axial fluid collection identified.    No acute intracranial hemorrhage identified. Supratentorial white matter   is unremarkable in attenuation. Gray-white differentiation is preserved   without CT evidence for acute transcortical infarction. There is no   significant midline shift, mass effect or herniation.    Sellar and suprasellar region are unremarkable in appearance.    Visualized paranasal sinuses and mastoid air cells are well aerated. No   significant cerebellar tonsillar herniation.    No displaced calvarial fractures are identified. No suspicious expansile   or destructive calvarial lesion identified. No significant scalp soft   tissue swelling identified.  
ACC: [id number] EXAM:  CT BRAIN   ORDERED BY: DAKOTAH BLEVINS     PROCEDURE DATE:  06/25/2023          INTERPRETATION:  EXAM: CT head without contrast    INDICATION: Altered mental status    TECHNIQUE: CT examination of the head performed without contrast.   Multiple axial images obtained from skull base to vertex.   Sagittal/coronal reformatted images obtained from axial data set. Images   reviewed in soft tissue and bone windows.      COMPARISON: None available.    FINDINGS:    Ventricles and sulci are normal in size and configuration for patient's   age. No hydrocephalus. No extra-axial fluid collection identified.    No acute intracranial hemorrhage identified. Supratentorial white matter   is unremarkable in attenuation. Gray-white differentiation is preserved   without CT evidence for acute transcortical infarction. There is no   significant midline shift, mass effect or herniation.    Sellar and suprasellar region are unremarkable in appearance.    Visualized paranasal sinuses and mastoid air cells are well aerated. No   significant cerebellar tonsillar herniation.    No displaced calvarial fractures are identified. No suspicious expansile   or destructive calvarial lesion identified. No significant scalp soft   tissue swelling identified.  
ACC: [id number] EXAM:  CT BRAIN   ORDERED BY: DAKOTAH BLEVINS     PROCEDURE DATE:  06/25/2023          INTERPRETATION:  EXAM: CT head without contrast    INDICATION: Altered mental status    TECHNIQUE: CT examination of the head performed without contrast.   Multiple axial images obtained from skull base to vertex.   Sagittal/coronal reformatted images obtained from axial data set. Images   reviewed in soft tissue and bone windows.      COMPARISON: None available.    FINDINGS:    Ventricles and sulci are normal in size and configuration for patient's   age. No hydrocephalus. No extra-axial fluid collection identified.    No acute intracranial hemorrhage identified. Supratentorial white matter   is unremarkable in attenuation. Gray-white differentiation is preserved   without CT evidence for acute transcortical infarction. There is no   significant midline shift, mass effect or herniation.    Sellar and suprasellar region are unremarkable in appearance.    Visualized paranasal sinuses and mastoid air cells are well aerated. No   significant cerebellar tonsillar herniation.    No displaced calvarial fractures are identified. No suspicious expansile   or destructive calvarial lesion identified. No significant scalp soft   tissue swelling identified.  
ACC: [id number] EXAM:  CT BRAIN   ORDERED BY: DAKOTAH BLEVINS     PROCEDURE DATE:  06/25/2023          INTERPRETATION:  EXAM: CT head without contrast    INDICATION: Altered mental status    TECHNIQUE: CT examination of the head performed without contrast.   Multiple axial images obtained from skull base to vertex.   Sagittal/coronal reformatted images obtained from axial data set. Images   reviewed in soft tissue and bone windows.      COMPARISON: None available.    FINDINGS:    Ventricles and sulci are normal in size and configuration for patient's   age. No hydrocephalus. No extra-axial fluid collection identified.    No acute intracranial hemorrhage identified. Supratentorial white matter   is unremarkable in attenuation. Gray-white differentiation is preserved   without CT evidence for acute transcortical infarction. There is no   significant midline shift, mass effect or herniation.    Sellar and suprasellar region are unremarkable in appearance.    Visualized paranasal sinuses and mastoid air cells are well aerated. No   significant cerebellar tonsillar herniation.    No displaced calvarial fractures are identified. No suspicious expansile   or destructive calvarial lesion identified. No significant scalp soft   tissue swelling identified.

## 2023-07-12 NOTE — BH INPATIENT PSYCHIATRY PROGRESS NOTE - NSBHFUPINTERVALCCFT_PSY_A_CORE
'I feel good'
'I feel better'
'I feel a little paranoid'
'I'm looking forward to discharge'
'I feel better'
'I feel a little calmer'
Stabilization of sxs
'I'm not doing good'
'I feel good. I woke up at 4 am this am. Wish I could sleep for longer.'  Pt was noted to have elevated BP/HR (see chart note earlier). Received Norvasc 5 mg with minimal improvement. Medicine consult called.   EKG/labs ordered. Pt will be seen by medicine later today. 
'My thinking is clear'

## 2023-07-12 NOTE — BH INPATIENT PSYCHIATRY PROGRESS NOTE - NSBHATTESTATTENDNAMEFT_PSY_A_CORE
Josh Goldberg

## 2023-07-12 NOTE — BH INPATIENT PSYCHIATRY PROGRESS NOTE - NSTXPSYCHODATETRGT_PSY_ALL_CORE
05-Jul-2023
16-Jul-2023
05-Jul-2023
16-Jul-2023
05-Jul-2023
16-Jul-2023
16-Jul-2023

## 2023-07-13 VITALS
HEART RATE: 96 BPM | RESPIRATION RATE: 18 BRPM | TEMPERATURE: 98 F | OXYGEN SATURATION: 100 % | DIASTOLIC BLOOD PRESSURE: 85 MMHG | SYSTOLIC BLOOD PRESSURE: 147 MMHG

## 2023-07-13 PROCEDURE — 99238 HOSP IP/OBS DSCHRG MGMT 30/<: CPT

## 2023-07-13 PROCEDURE — 99232 SBSQ HOSP IP/OBS MODERATE 35: CPT

## 2023-07-13 RX ORDER — ARIPIPRAZOLE 15 MG/1
300 TABLET ORAL ONCE
Refills: 0 | Status: COMPLETED | OUTPATIENT
Start: 2023-07-13 | End: 2023-07-13

## 2023-07-13 RX ADMIN — ARIPIPRAZOLE 300 MILLIGRAM(S): 15 TABLET ORAL at 10:18

## 2023-07-13 RX ADMIN — ARIPIPRAZOLE 20 MILLIGRAM(S): 15 TABLET ORAL at 10:17

## 2023-07-13 RX ADMIN — AMLODIPINE BESYLATE 10 MILLIGRAM(S): 2.5 TABLET ORAL at 10:17

## 2023-07-13 RX ADMIN — GABAPENTIN 300 MILLIGRAM(S): 400 CAPSULE ORAL at 10:17

## 2023-07-13 NOTE — BH INPATIENT PSYCHIATRY DISCHARGE NOTE - NSBHDCRISKMITIGATEFT_PSY_ALL_CORE
Abilify 20mg qd for acute nelda. Received abilify maintenna 300mg on 7/13/23. Next due on/about 8/13/23  Vivitrol 380mg for AUD. Received on 7/13/23. Next due on/about 8/12/23

## 2023-07-13 NOTE — BH INPATIENT PSYCHIATRY DISCHARGE NOTE - REASON FOR ADMISSION
Patient presents to ED by family following several days of manic sxs including poor sleep and aggression in addition to regular etoh use

## 2023-07-13 NOTE — PROGRESS NOTE ADULT - ATTENDING COMMENTS
37 yo F, domiciled with family, unemployed, history of bipolar disorder, alcohol use (3 glasses of wine per night, last use 1-2 weeks ago), nicotine use disorder (jewel, 1 pod per day since 17 years old) who was brought in by family for manic behavior, currently admitted to inpatient psych. Med consulted for hypertension and tachycardia.     # HTN   - now on Amlodipine 10mg po daily for now (7/6-, switched from Nifedipine XL 30mg po bid)  - SBP in 140- will follow in am-   consider adding HCTZ 12.5mg po daily or changing Amlodipine 10mg to Nifedipine XL 60mg daily     # Bipolar d/o   # Manic episode  - active management per Psychiatry team   MEDICATIONS  (STANDING):  amLODIPine   Tablet 10 milliGRAM(s) Oral daily  ARIPiprazole 20 milliGRAM(s) Oral daily  gabapentin 300 milliGRAM(s) Oral three times a day  naltrexone   Injectable,  Long Acting. 380 milliGRAM(s) IntraMuscular once  QUEtiapine 50 milliGRAM(s) Oral at bedtime  QUEtiapine 50 milliGRAM(s) Oral once    MEDICATIONS  (PRN):  acetaminophen     Tablet .. 650 milliGRAM(s) Oral every 6 hours PRN Temp greater or equal to 38C (100.4F), Mild Pain (1 - 3), Moderate Pain (4 - 6), Severe Pain (7 - 10)  aluminum hydroxide/magnesium hydroxide/simethicone Suspension 30 milliLiter(s) Oral every 6 hours PRN Dyspepsia  benztropine 1 milliGRAM(s) Oral daily PRN Antipsychotic induced akathisia  diphenhydrAMINE 50 milliGRAM(s) Oral at bedtime PRN Insomnia  haloperidol     Tablet 5 milliGRAM(s) Oral every 6 hours PRN agitation  LORazepam     Tablet 2 milliGRAM(s) Oral every 6 hours PRN agitation/hallucinations  magnesium hydroxide Suspension 30 milliLiter(s) Oral daily PRN Constipation  nicotine  Polacrilex Gum 2 milliGRAM(s) Oral every 2 hours PRN nrt  senna 1 Tablet(s) Oral at bedtime PRN constipation      # Cig smoking  nicotine  Polacrilex Gum 2 milliGRAM(s) Oral every 2 hours PRN     thank you for allowing medicine to participate in the care, milo Jaramillo.
39 yo F, domiciled with family, unemployed, history of bipolar disorder, alcohol use (3 glasses of wine per night, last use 1-2 weeks ago), nicotine use disorder (jewel, 1 pod per day since 17 years old) who was brought in by family for manic behavior, currently admitted to inpatient psych. Med consulted for hypertension and tachycardia.     # HTN with tachycardia  - manic episode might be contributory, no sign of heart failure.   - BP now under controlled on Nifedipine ER 30 mg po BID  - may change NIfedipine ER to Amlodipine 10mg po daily starting tomorrow am  (7/6), anticipating d/c on Fri 7/7, daily dosing is better     # Bipolar d/o   # Manic episode  - active management per Psychiatry team   - ARIPiprazole 10 milliGRAM(s) Oral daily  - QUEtiapine 50 milliGRAM(s) Oral at bedtime  benztropine 1 milliGRAM(s) Oral daily PRN Antipsychotic induced akathisia  diphenhydrAMINE 50 milliGRAM(s) Oral at bedtime PRN Insomnia  haloperidol     Tablet 5 milliGRAM(s) Oral every 6 hours PRN agitation  LORazepam     Tablet 2 milliGRAM(s) Oral every 6 hours PRN anxiety    # Cig smoking  nicotine  Polacrilex Gum 2 milliGRAM(s) Oral every 2 hours PRN nrt    thank you for allowing medicine to participate in the care, milo BERMUDEZ RN .
37 yo F, domiciled with family, unemployed, history of bipolar disorder, alcohol use (3 glasses of wine per night, last use 1-2 weeks ago), nicotine use disorder (jewel, 1 pod per day since 17 years old) who was brought in by family for manic behavior, currently admitted to inpatient psych. Med consulted for hypertension and tachycardia.     # HTN   - now on Amlodipine 10mg po daily for now (7/6-, switched from Nifedipine XL 30mg po bid)  bp acceptable range though still high -she is being discharged today,  would rec to continue with amlodipine 10 mg po q daily for now and to follow up with PMD to recheck BP and adjust medication ( Hypertension management better be done as out-patient)     # Bipolar d/o   # Manic episode  - active management per Psychiatry team   MEDICATIONS  (STANDING):  amLODIPine   Tablet 10 milliGRAM(s) Oral daily  ARIPiprazole 20 milliGRAM(s) Oral daily  gabapentin 300 milliGRAM(s) Oral three times a day  naltrexone   Injectable,  Long Acting. 380 milliGRAM(s) IntraMuscular once  QUEtiapine 50 milliGRAM(s) Oral at bedtime  QUEtiapine 50 milliGRAM(s) Oral once    MEDICATIONS  (PRN):  acetaminophen     Tablet .. 650 milliGRAM(s) Oral every 6 hours PRN Temp greater or equal to 38C (100.4F), Mild Pain (1 - 3), Moderate Pain (4 - 6), Severe Pain (7 - 10)  aluminum hydroxide/magnesium hydroxide/simethicone Suspension 30 milliLiter(s) Oral every 6 hours PRN Dyspepsia  benztropine 1 milliGRAM(s) Oral daily PRN Antipsychotic induced akathisia  diphenhydrAMINE 50 milliGRAM(s) Oral at bedtime PRN Insomnia  haloperidol     Tablet 5 milliGRAM(s) Oral every 6 hours PRN agitation  LORazepam     Tablet 2 milliGRAM(s) Oral every 6 hours PRN agitation/hallucinations  magnesium hydroxide Suspension 30 milliLiter(s) Oral daily PRN Constipation  nicotine  Polacrilex Gum 2 milliGRAM(s) Oral every 2 hours PRN nrt  senna 1 Tablet(s) Oral at bedtime PRN constipation      # Cig smoking  nicotine  Polacrilex Gum 2 milliGRAM(s) Oral every 2 hours PRN     thank you for allowing medicine to participate in the care, dw Dr. Clint. pt is stable for discharge from medical standpoint.
37 yo F, domiciled with family, unemployed, history of bipolar disorder, alcohol use (3 glasses of wine per night, last use 1-2 weeks ago), nicotine use disorder (jewel, 1 pod per day since 17 years old) who was brought in by family for manic behavior, currently admitted to inpatient psych. Med consulted for hypertension and tachycardia.     CC: Pt seen w. Dr. Preciado, In good mood. No complaints.     # HTN with tachycardia   - /93 this am   - continue Amlodipine 10mg po daily (7/6-, swtiched from Nifedipine XL 30mg po bid)    # Bipolar d/o   # Manic episode  - active management per Psychiatry team   - ARIPiprazole 10 milliGRAM(s) Oral daily  - QUEtiapine 50 milliGRAM(s) Oral at bedtime  benztropine 1 milliGRAM(s) Oral daily PRN Antipsychotic induced akathisia  diphenhydrAMINE 50 milliGRAM(s) Oral at bedtime PRN Insomnia  haloperidol     Tablet 5 milliGRAM(s) Oral every 6 hours PRN agitation  LORazepam     Tablet 2 milliGRAM(s) Oral every 6 hours PRN anxiety    # Cig smoking  nicotine  Polacrilex Gum 2 milliGRAM(s) Oral every 2 hours PRN nrt    thank you for allowing medicine to participate in the care, milo BERMUDEZ
39 yo F, domiciled with family, unemployed, history of bipolar disorder, alcohol use (3 glasses of wine per night, last use 1-2 weeks ago), nicotine use disorder (jewel, 1 pod per day since 17 years old) who was brought in by family for manic behavior, currently admitted to inpatient psych. Med consulted for hypertension and tachycardia.     # HTN   - now on Amlodipine 10mg po daily for now (7/6-, switched from Nifedipine XL 30mg po bid)  - if persistently elevated and not controlled, consider adding HCTZ 12.5mg po daily or changing Amlodipine 10mg to Nifedipine XL 60mg daily     # Bipolar d/o   # Manic episode  - active management per Psychiatry team   - ARIPiprazole 10 milliGRAM(s) Oral daily  - QUEtiapine 50 milliGRAM(s) Oral at bedtime  benztropine 1 milliGRAM(s) Oral daily PRN Antipsychotic induced akathisia  diphenhydrAMINE 50 milliGRAM(s) Oral at bedtime PRN Insomnia  haloperidol     Tablet 5 milliGRAM(s) Oral every 6 hours PRN agitation  LORazepam     Tablet 2 milliGRAM(s) Oral every 6 hours PRN anxiety    # Cig smoking  nicotine  Polacrilex Gum 2 milliGRAM(s) Oral every 2 hours PRN     thank you for allowing medicine to participate in the care, milo Jaramillo.
39 yo F, domiciled with family, unemployed, history of bipolar disorder, alcohol use (3 glasses of wine per night, last use 1-2 weeks ago), nicotine use disorder (jewel, 1 pod per day since 17 years old) who was brought in by family for manic behavior, currently admitted to inpatient psych. Med consulted for hypertension and tachycardia.     # HTN with tachycardia   - continue Amlodipine 10mg po daily for now (7/6-, switched from Nifedipine XL 30mg po bid)  - /90 this am 7/10, continue monitoring BP  - if persistently elevated and not controlled, consider adding HCTZ 12.5mg po daily or changing Amlodipine 10mg to Nifedipine XL 60mg daily     # Bipolar d/o   # Manic episode  - active management per Psychiatry team   - ARIPiprazole 10 milliGRAM(s) Oral daily  - QUEtiapine 50 milliGRAM(s) Oral at bedtime  benztropine 1 milliGRAM(s) Oral daily PRN Antipsychotic induced akathisia  diphenhydrAMINE 50 milliGRAM(s) Oral at bedtime PRN Insomnia  haloperidol     Tablet 5 milliGRAM(s) Oral every 6 hours PRN agitation  LORazepam     Tablet 2 milliGRAM(s) Oral every 6 hours PRN anxiety    # Cig smoking  nicotine  Polacrilex Gum 2 milliGRAM(s) Oral every 2 hours PRN     thank you for allowing medicine to participate in the care, milo BERMUDEZ .
39 yo F, domiciled with family, unemployed, history of bipolar disorder, alcohol use (3 glasses of wine per night, last use 1-2 weeks ago), nicotine use disorder (jewel, 1 pod per day since 17 years old) who was brought in by family for manic behavior, currently admitted to inpatient psych. Med consulted for hypertension and tachycardia.     # HTN with tachycardia  - manic episode might be contributory, no sign of heart failure.   - Amlodipine stopped  - started Nifedipine ER 30 mg po q daily(7/2-) to follow BP/HR and titrate up    # Bipolar d/o   # Manic episode  - active management per Psychiatry team   - ARIPiprazole 10 milliGRAM(s) Oral daily  - QUEtiapine 50 milliGRAM(s) Oral at bedtime  benztropine 1 milliGRAM(s) Oral daily PRN Antipsychotic induced akathisia  diphenhydrAMINE 50 milliGRAM(s) Oral at bedtime PRN Insomnia  haloperidol     Tablet 5 milliGRAM(s) Oral every 6 hours PRN agitation  LORazepam     Tablet 2 milliGRAM(s) Oral every 6 hours PRN anxiety    # Cig smoking  nicotine  Polacrilex Gum 2 milliGRAM(s) Oral every 2 hours PRN nrt    thank you for allowing medicine to participate in the care, milo BERMUDEZ RN
39 yo F, domiciled with family, unemployed, history of bipolar disorder, alcohol use (3 glasses of wine per night, last use 1-2 weeks ago), nicotine use disorder (jewel, 1 pod per day since 17 years old) who was brought in by family for manic behavior, currently admitted to inpatient psych. Med consulted for hypertension and tachycardia.     CC: Pt seen w. Dr. Preciado, In good mood. No complaints.     # HTN with tachycardia   - BP now under controlled on Nifedipine ER 30 mg po BID  - may change Nifedipine ER to Amlodipine 10mg po daily this am  (7/6)    # Bipolar d/o   # Manic episode  - active management per Psychiatry team   - ARIPiprazole 10 milliGRAM(s) Oral daily  - QUEtiapine 50 milliGRAM(s) Oral at bedtime  benztropine 1 milliGRAM(s) Oral daily PRN Antipsychotic induced akathisia  diphenhydrAMINE 50 milliGRAM(s) Oral at bedtime PRN Insomnia  haloperidol     Tablet 5 milliGRAM(s) Oral every 6 hours PRN agitation  LORazepam     Tablet 2 milliGRAM(s) Oral every 6 hours PRN anxiety    # Cig smoking  nicotine  Polacrilex Gum 2 milliGRAM(s) Oral every 2 hours PRN nrt    thank you for allowing medicine to participate in the care, milo BERMUDEZ RN .
37 yo F, domiciled with family, unemployed, history of bipolar disorder, alcohol use (3 glasses of wine per night, last use 1-2 weeks ago), nicotine use disorder (jewel, 1 pod per day since 17 years old) who was brought in by family for manic behavior, currently admitted to inpatient psych. Med consulted for hypertension and tachycardia.     CC: Pt seen wBrown Preciado, In good mood. No complaints.     # HTN with tachycardia   - /99 this am, pt reports not sleeping well overnight since got up a few times for the bathroom. advised pt to cut back on water intake in evening/night time   - continue Amlodipine 10mg po daily (7/6-, switched from Nifedipine XL 30mg po bid)  - monitor BP    # Bipolar d/o   # Manic episode  - active management per Psychiatry team   - ARIPiprazole 10 milliGRAM(s) Oral daily  - QUEtiapine 50 milliGRAM(s) Oral at bedtime  benztropine 1 milliGRAM(s) Oral daily PRN Antipsychotic induced akathisia  diphenhydrAMINE 50 milliGRAM(s) Oral at bedtime PRN Insomnia  haloperidol     Tablet 5 milliGRAM(s) Oral every 6 hours PRN agitation  LORazepam     Tablet 2 milliGRAM(s) Oral every 6 hours PRN anxiety    # Cig smoking  nicotine  Polacrilex Gum 2 milliGRAM(s) Oral every 2 hours PRN nrt    thank you for allowing medicine to participate in the care, milo  RNs

## 2023-07-13 NOTE — BH INPATIENT PSYCHIATRY DISCHARGE NOTE - NSDCMRMEDTOKEN_GEN_ALL_CORE_FT
Abilify 20 mg oral tablet: 1 tab(s) orally once a day  amLODIPine 10 mg oral tablet: 1 tab(s) orally once a day  QUEtiapine 50 mg oral tablet: 1 tab(s) orally once a day (at bedtime)

## 2023-07-13 NOTE — PROGRESS NOTE ADULT - SUBJECTIVE AND OBJECTIVE BOX
SUBJECTIVE / INTERVAL HPI: Patient seen and examined at bedside. Patient states she feels good at this time, and is all packed up ready to leave.     VITAL SIGNS:  Vital Signs Last 24 Hrs  T(C): 36.4 (12 Jul 2023 16:54), Max: 36.4 (12 Jul 2023 16:54)  T(F): 97.5 (12 Jul 2023 16:54), Max: 97.5 (12 Jul 2023 16:54)  HR: 94 (12 Jul 2023 16:54) (94 - 94)  BP: 154/98 (12 Jul 2023 16:54) (154/98 - 154/98)  BP(mean): --  RR: 18 (12 Jul 2023 16:54) (18 - 18)  SpO2: 100% (12 Jul 2023 16:54) (100% - 100%)        PHYSICAL EXAM:    General: WDWN  HEENT: NC/AT; PERRL, anicteric sclera; MMM  Neck: supple  Cardiovascular: +S1/S2; RRR  Respiratory: CTA B/L; no W/R/R  Gastrointestinal: soft, NT/ND; +BSx4  Extremities: WWP; no edema, clubbing or cyanosis  Vascular: 2+ radial, DP/PT pulses B/L  Neurological: AAOx3; no focal deficits    MEDICATIONS:  MEDICATIONS  (STANDING):  amLODIPine   Tablet 10 milliGRAM(s) Oral daily  ARIPiprazole 20 milliGRAM(s) Oral daily  ARIPiprazole Injectable, Long Acting. (ABILIFY MAINTENA) 300 milliGRAM(s) IntraMuscular once  gabapentin 300 milliGRAM(s) Oral three times a day  QUEtiapine 50 milliGRAM(s) Oral at bedtime  QUEtiapine 50 milliGRAM(s) Oral once    MEDICATIONS  (PRN):  acetaminophen     Tablet .. 650 milliGRAM(s) Oral every 6 hours PRN Temp greater or equal to 38C (100.4F), Mild Pain (1 - 3), Moderate Pain (4 - 6), Severe Pain (7 - 10)  aluminum hydroxide/magnesium hydroxide/simethicone Suspension 30 milliLiter(s) Oral every 6 hours PRN Dyspepsia  benztropine 1 milliGRAM(s) Oral daily PRN Antipsychotic induced akathisia  diphenhydrAMINE 50 milliGRAM(s) Oral at bedtime PRN Insomnia  haloperidol     Tablet 5 milliGRAM(s) Oral every 6 hours PRN agitation  magnesium hydroxide Suspension 30 milliLiter(s) Oral daily PRN Constipation  nicotine  Polacrilex Gum 2 milliGRAM(s) Oral every 2 hours PRN nrt  senna 1 Tablet(s) Oral at bedtime PRN constipation      ALLERGIES:  Allergies    amoxicillin (Rash)    Intolerances        LABS:              CAPILLARY BLOOD GLUCOSE          RADIOLOGY & ADDITIONAL TESTS: Reviewed.    ASSESSMENT:    PLAN:

## 2023-07-13 NOTE — BH INPATIENT PSYCHIATRY DISCHARGE NOTE - HOSPITAL COURSE
6/29 Compliant with regimen, bright, labile- tearful at times. Reports overall improvement in sleep. Denies psychotic sxs. Taking meds. Cogentin 1mg prn added to regimen for akathisia  6/30 Pt is visible on the unit, attending select groups including 12 steps last night. Had one episode of emesis yesterday-feels better today. Sleep is overall poor but continuing to improve. Today noted to be quite pressured, suspicious and overwhelmed. Agreed to increase in ability to 10mg. Aware of cogentin prn for akathisia but has not requested or received it. Medicine team consulted for elevated BP. Nifedipine 30mg qd initiated  7/3 Patient reports paranoid ideation, appears quite anxious and suspicious with labile mood, tearful at times. No si/hi/avh  endorsed. Going to groups. Nifedipine increased to 30mg bid  7/5 Patient endorses paranoid ideation and v/h, very anxious. will plan to titrate abilify to 15mg and add gabapentin 300mg tid for anxiety/mood/and AUD  7/6 Patient continuing to endorse paranoid ideation, improvement in sleep last night due to prns. Going to groups. Nifedipine d/c. Norvasc 10 initiated  7/7 Tolerating changes in med regimen well, continues to appear elevated, but is endorsing more clarity of thought, lessening of PI  7/10 Abilify to be increased to 20mg qd, naltrexone 50mg qd initiated. Going to groups  7/11 Doing well, no complaints, Tolerating naltrexone without issue, awaiting vivitrol tomorrow and abilify maintenna on day of discharge. Going to groups  7/12 Patient is bright, pleasant, and has no complaints. Received vivitrol without issue. Pleasant, bright, slightly anxious, but more stable mood Awaiting discharge tomorrow. Going to groups. Safety plan completed
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
## 2023-07-13 NOTE — BH INPATIENT PSYCHIATRY DISCHARGE NOTE - NSDCCPCAREPLAN_GEN_ALL_CORE_FT
PRINCIPAL DISCHARGE DIAGNOSIS  Diagnosis: Bipolar disorder with psychotic features  Assessment and Plan of Treatment: Continue medication regimen and follow up with aftercare appointments      SECONDARY DISCHARGE DIAGNOSES  Diagnosis: Moderate alcohol use disorder  Assessment and Plan of Treatment: Received first dose of vivitrol on 7/12/23.  You are next due on/about 8/12/23    Diagnosis: HTN (hypertension)  Assessment and Plan of Treatment: Continue medication regimen and follow up with PCP for continued treatment of HTN

## 2023-07-13 NOTE — PROGRESS NOTE ADULT - ASSESSMENT
37 yo F, domiciled with family, unemployed, history of bipolar disorder, alcohol use (3 glasses of wine per night, last use 1-2 weeks ago), nicotine use disorder (jewel, 1 pod per day since 17 years old) who was brought in by family for manic behavior, currently admitted to inpatient psych. Med consulted for hypertension and tachycardia.     # HTN with tachycardia -- IMPROVING   - BP improving on [id number] daily, continue monitor BP, consider adding HCTZ 12.5mg po daily or changing Amlodipine back to Nifedipine XL at 60mg po daily  - continue Amlodipine 10mg po daily for now - please provide patient with medication on discharge    # Bipolar d/o   # Manic episode  - active management per Psychiatry team   - ARIPiprazole 10 milliGRAM(s) Oral daily  - QUEtiapine 50 milliGRAM(s) Oral at bedtime  benztropine 1 milliGRAM(s) Oral daily PRN Antipsychotic induced akathisia  diphenhydrAMINE 50 milliGRAM(s) Oral at bedtime PRN Insomnia  haloperidol     Tablet 5 milliGRAM(s) Oral every 6 hours PRN agitation  LORazepam     Tablet 2 milliGRAM(s) Oral every 6 hours PRN anxiety    # Cig smoking  nicotine  Polacrilex Gum 2 milliGRAM(s) Oral every 2 hours PRN nrt    thank you for allowing medicine to participate in the care

## 2023-07-13 NOTE — BH INPATIENT PSYCHIATRY DISCHARGE NOTE - NSBHFUPINTERVALHXFT_PSY_A_CORE
Patient visible on the unit, noted to be bright pleasant with no complaints. She received first injection of abilify maintenna 300mg this morning in right deltoid without issue. Continuing to endorse good mood, improved sleep, appetite etc. No acute medical concerns. Future oriented and looking forward to discharge. No si/hi/avh or PI endorsed.   Medications sent to preferred pharmacy.

## 2023-07-13 NOTE — BH DISCHARGE NOTE NURSING/SOCIAL WORK/PSYCH REHAB - NSDCPRGOAL_PSY_ALL_CORE
Pt has attended groups regularly since her admission and has participated fully and interactively. Pt has been consistently well related in the group setting and in behavioral control. Pt displays full range of affect, and often appeared with bright affect in groups but also able to speak about challenges in the group setting. Pt endorses intention to engage actively in AA groups and within the AA community following discharge. Pt appears future oriented and completed a safety plan prior to discharge. Pt received a copy of the safety plan to take with her.

## 2023-07-13 NOTE — PROGRESS NOTE ADULT - PROVIDER SPECIALTY LIST ADULT
Internal Medicine
Hospitalist

## 2023-07-13 NOTE — BH DISCHARGE NOTE NURSING/SOCIAL WORK/PSYCH REHAB - NSDCNEXTLEVEL_PSY_ALL_CORE
03/01/21      Estephania Schwarz  [address] [place] [address]    Dear Estephania,    We look forward to seeing you on 05/06/2021 arrival 7:45am  for your procedure 8:45am     To better prepare you, please observe the following:     Preparing for GI Procedure  ? Please schedule an appointment within 30 days of surgery with your primary care doctor for a history and physical. They will perform any required labs or tests before surgery. If your doctor is not an [facility] physician, please ask them to fax the H&P and test results to [phone].      You will receive a call from our Pre-Admission Testing department prior to your surgical procedure. This call is necessary to get important information about your health history, to ensure you are properly prepared for surgery, and minimize the chance of having your procedure delayed or rescheduled.    WHAT TO DO ABOUT YOUR PRESCRIPTION MEDICATIONS  You must continue taking all your previously prescribed medications as directed unless specifically told not to by your doctor, or if you find them on the list below    STOP THESE MEDICATIONS   • Aspirin stop 3 days prior unless instructed otherwise by your doctor or Cardiologist.   • Erectile dysfunction medications stop 2 days prior    • Herbal medications/ Vitamins/ Fish Oil stop 7 days prior unless otherwise directed by your doctor.    • Phentermine stop 7 days prior   • Selegiline patches stop 21 days prior   Your doctor will have given you instructions about modifying your Insulin regimen 1 day prior.  If on a blood thinner, ask your doctor, Cardiologist and/or surgeon if and when it should be stopped.    You will be contacted by phone or email by a company called [employer]. This is an online educational platform that will provide education regarding your procedure. Please take the time to review this video.        Day before Procedure  Please follow any specific instructions given to you regarding any prep needed  related to your procedure.    Day of Procedure  Take your normal morning medications with a sip of water first thing in the morning prior to surgery, except those medications your doctor or our Pre-Admission Testing office has specifically told you not to take.     If you choose, for your convenience we have an outpatient pharmacy on-site and can arrange to have your discharge prescription filled before you leave, to save you a trip. You may want to bring enough money to cover the cost of this prescription.      Each patient that comes through the GI department is given individualized care and attention. The doctors and nurses spend the time necessary with each patient to ensure the best care. Sometimes, this can cause delays. You may want to bring a book, puzzle or music player to keep you busy if you experience a delay.       Procedures usually involve giving medications that put you to sleep or ease anxiety. For this reason, you must have a ride home from the hospital and someone (over age 16) should stay with you for the first 24 hours after your procedure. The procedure will be canceled if you do not have a responsible person with you. You are not allowed to drive after receiving sedation of any type.     For the comfort and privacy of all our patients before and after your procedure, we strongly discourage bringing small children as guests and only allow two adult visitors in the procedural area at any given time.     Bring a photo ID, your insurance card and any copay due at time of service. Leave all valuables at home. Do not wear jewelry, makeup, body lotion, or fingernail polish.        If you have any questions or concerns before the day of your procedure, please call [phone] to have them answered.     After your procedure you may get a survey in the mail. Please take the opportunity to fill it out letting us know what we did well or what we could improve on. Our goal is to provide our patients with  the best possible care and we couldn’t do it without your input.     We look forward to seeing you,  Your GI Services Team   Yes

## 2023-07-13 NOTE — BH DISCHARGE NOTE NURSING/SOCIAL WORK/PSYCH REHAB - NSCDUDCCRISIS_PSY_A_CORE
[facility]  1 [phone] [facility] ([phone])  Text "WELL" to [phone]  Website: [email or web address].[email or web address]/.  [facility]  [phone]/.  [facility]  [address] [place] [address]  [phone]   Hours:  Monday through Friday from 9 AM to 3 PM/[facility]

## 2023-07-13 NOTE — BH DISCHARGE NOTE NURSING/SOCIAL WORK/PSYCH REHAB - NSDCADDINFO1FT_PSY_ALL_CORE
[facility] VAGINITIS    You have a vaginal yeast infection. We took cultures of your vaginal discharge that we will test. Results come back in 3-5 days. We will call you with results and let you know if additional treatment is needed.    Please take the Diflucan 1 tablet x once. You can repeat the medication in 3 days if symptoms persist.    Please use good vaginal hygiene with mild soap and water.     Do NOT douche or use vaginal wipes    Make sure to urinate and clean vaginal area after sex.    Wear cotton panties    You can try taking a women's health probiotic (specifically for vaginal and urinary tract health) over the counter to help prevent recurrent infections.     Intake appointment scheduled for MONDAY, JULY 17th at 11am with "Jimena." Please bring photo ID, insurance card and discharge summary. MEDICAID ID: [id number]
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
## 2023-07-13 NOTE — BH INPATIENT PSYCHIATRY DISCHARGE NOTE - NSDCPROCEDURESFT_PSY_ALL_CORE
ACC: [id number] EXAM:  CT BRAIN   ORDERED BY: DAKOTAH BLEVINS     PROCEDURE DATE:  06/25/2023          INTERPRETATION:  EXAM: CT head without contrast    INDICATION: Altered mental status    TECHNIQUE: CT examination of the head performed without contrast.   Multiple axial images obtained from skull base to vertex.   Sagittal/coronal reformatted images obtained from axial data set. Images   reviewed in soft tissue and bone windows.      COMPARISON: None available.    FINDINGS:    Ventricles and sulci are normal in size and configuration for patient's   age. No hydrocephalus. No extra-axial fluid collection identified.    No acute intracranial hemorrhage identified. Supratentorial white matter   is unremarkable in attenuation. Gray-white differentiation is preserved   without CT evidence for acute transcortical infarction. There is no   significant midline shift, mass effect or herniation.    Sellar and suprasellar region are unremarkable in appearance.    Visualized paranasal sinuses and mastoid air cells are well aerated. No   significant cerebellar tonsillar herniation.    No displaced calvarial fractures are identified. No suspicious expansile   or destructive calvarial lesion identified. No significant scalp soft   tissue swelling identified.          IMPRESSION:    No acute intracranial hemorrhage, hydrocephalus or extra-axial fluid   collection.  No CT evidence for acute transcortical infarction. Please note that MR   imaging is a more sensitive imaging modality for detection of acute   infarction and may be obtained as clinically warranted.    If clinicians have any questions/need for clarification regarding this   report, Dr. Abdi Hodges can be best reached via the patient [email or web address] email system    --- End of Report ---

## 2023-07-13 NOTE — BH DISCHARGE NOTE NURSING/SOCIAL WORK/PSYCH REHAB - PATIENT PORTAL LINK FT
You can access the FollowMyHealth Patient Portal offered by [facility] by registering at the following website: [email or web address]. By joining [employer]’s FollowMyHealth portal, you will also be able to view your health information using other applications (apps) compatible with our system.

## 2023-07-13 NOTE — BH INPATIENT PSYCHIATRY DISCHARGE NOTE - NSDCRECOMMENDMEDICALFT_PSY_ALL_CORE
Would recommend that patient follow up with her outpatient primary care physician for continued treatment of hypertension

## 2023-07-13 NOTE — BH DISCHARGE NOTE NURSING/SOCIAL WORK/PSYCH REHAB - NSDCOTHERTYPOFSERV_GEN_ALL_CORE
At any hour of any day, in almost any language, from phone, tablet or computer, [facility] is your connection to get the help you need:    Suicide prevention and crisis counseling  Peer support and short-term counseling via telephone, text and web  Referrals and warm transfer to other services  Follow-up to check that you have connected to care and it is working for you

## 2023-07-13 NOTE — BH INPATIENT PSYCHIATRY DISCHARGE NOTE - NSBHMETABOLIC_PSY_ALL_CORE_FT
BMI: BMI (kg/m2): 33.1 (06-27-23 @ 18:04)  HbA1c: A1C with Estimated Average Glucose Result: 4.7 % (06-29-23 @ 07:37)    Glucose:   BP: 154/98 (07-12-23 @ 16:54) (130/78 - 155/100)  Lipid Panel: Date/Time: 06-28-23 @ 10:58  Cholesterol, Serum: 137  Direct LDL: --  HDL Cholesterol, Serum: 39  Total Cholesterol/HDL Ration Measurement: --  Triglycerides, Serum: 64

## 2023-07-17 DIAGNOSIS — F19.19 OTHER PSYCHOACTIVE SUBSTANCE ABUSE WITH UNSPECIFIED PSYCHOACTIVE SUBSTANCE-INDUCED DISORDER: ICD-10-CM

## 2023-07-17 DIAGNOSIS — F10.10 ALCOHOL ABUSE, UNCOMPLICATED: ICD-10-CM

## 2023-07-17 DIAGNOSIS — F31.9 BIPOLAR DISORDER, UNSPECIFIED: ICD-10-CM

## 2023-07-17 DIAGNOSIS — F17.200 NICOTINE DEPENDENCE, UNSPECIFIED, UNCOMPLICATED: ICD-10-CM

## 2023-07-17 DIAGNOSIS — Z88.1 ALLERGY STATUS TO OTHER ANTIBIOTIC AGENTS STATUS: ICD-10-CM

## 2023-07-17 DIAGNOSIS — F31.2 BIPOLAR DISORDER, CURRENT EPISODE MANIC SEVERE WITH PSYCHOTIC FEATURES: ICD-10-CM

## 2023-07-17 DIAGNOSIS — R00.0 TACHYCARDIA, UNSPECIFIED: ICD-10-CM

## 2023-07-17 DIAGNOSIS — I10 ESSENTIAL (PRIMARY) HYPERTENSION: ICD-10-CM

## 2023-07-17 DIAGNOSIS — D50.9 IRON DEFICIENCY ANEMIA, UNSPECIFIED: ICD-10-CM

## 2023-07-17 NOTE — BH SOCIAL WORK CONFIRMATION FOLLOW UP NOTE - NSLINKEDTOLOC_PSY_ALL_CORE
[facility]  17-Jul-2023 11:00  [address], [place] [address]  [phone]

## 2023-07-17 NOTE — BH SOCIAL WORK CONFIRMATION FOLLOW UP NOTE - NSCOMMENTS_PSY_ALL_CORE
Linkage call made to clinic above and SW spoke with Sary who confirmed pt attended appt. Caring call also made, no answer, VM left request callback. SW to remain available.  Linkage call made to clinic above and SW spoke with Sary who confirmed pt attended appt. Caring call also made, no answer, VM left requesting callback. SW to remain available.  Linkage call made to clinic above and JESSENIA spoke with Sary who confirmed pt attended appt. Caring call also made, no answer, VM left requesting callback. SW to remain available. 7/19: Pt contacted JESSENIA via text ([phone]. Per pt, "Belia been good, got right back into my routine with exercise and all that. Its been good transitioning back. No temptation around so that's great." Pt also confirmed she attended appt on 7/14 and has group therapy scheduled for Friday 7/21. Denies SI/HI/AVH.  7/14/23: Caring call also made, no answer, VM left requesting callback. SW to remain available.     7/17/23: Linkage call made to clinic above and SW spoke with Sary who confirmed pt attended appt.    7/19/23: Pt contacted SW via text ([phone]). Per pt, "Belia been good, got right back into my routine with exercise and all that. Its been good transitioning back. No temptation around so that's great." Pt also confirmed she attended appt on 7/17 and has group therapy scheduled for Friday 7/21. Denies SI/HI/AVH.

## 2023-10-08 PROCEDURE — 85027 COMPLETE CBC AUTOMATED: CPT

## 2023-10-08 PROCEDURE — 83735 ASSAY OF MAGNESIUM: CPT

## 2023-10-08 PROCEDURE — 84100 ASSAY OF PHOSPHORUS: CPT

## 2023-10-08 PROCEDURE — 85025 COMPLETE CBC W/AUTO DIFF WBC: CPT

## 2023-10-08 PROCEDURE — 83036 HEMOGLOBIN GLYCOSYLATED A1C: CPT

## 2023-10-08 PROCEDURE — 80053 COMPREHEN METABOLIC PANEL: CPT

## 2023-10-08 PROCEDURE — 84702 CHORIONIC GONADOTROPIN TEST: CPT

## 2023-10-08 PROCEDURE — 80061 LIPID PANEL: CPT

## 2023-10-08 PROCEDURE — 83605 ASSAY OF LACTIC ACID: CPT

## 2023-10-08 PROCEDURE — 84443 ASSAY THYROID STIM HORMONE: CPT

## 2023-10-08 PROCEDURE — 36415 COLL VENOUS BLD VENIPUNCTURE: CPT
